# Patient Record
Sex: FEMALE | Race: BLACK OR AFRICAN AMERICAN | NOT HISPANIC OR LATINO | Employment: FULL TIME | URBAN - NONMETROPOLITAN AREA
[De-identification: names, ages, dates, MRNs, and addresses within clinical notes are randomized per-mention and may not be internally consistent; named-entity substitution may affect disease eponyms.]

---

## 2017-04-24 ENCOUNTER — HOSPITAL ENCOUNTER (EMERGENCY)
Facility: HOSPITAL | Age: 32
Discharge: HOME | End: 2017-04-24
Attending: EMERGENCY MEDICINE | Admitting: EMERGENCY MEDICINE

## 2017-04-24 VITALS
RESPIRATION RATE: 19 BRPM | SYSTOLIC BLOOD PRESSURE: 142 MMHG | HEART RATE: 79 BPM | DIASTOLIC BLOOD PRESSURE: 88 MMHG | WEIGHT: 208 LBS | OXYGEN SATURATION: 100 % | HEIGHT: 65 IN | BODY MASS INDEX: 34.66 KG/M2 | TEMPERATURE: 98.1 F

## 2017-04-24 DIAGNOSIS — I10 ESSENTIAL HYPERTENSION: Primary | ICD-10-CM

## 2017-04-24 LAB
ALBUMIN SERPL-MCNC: 4 G/DL (ref 3.5–5)
ALBUMIN/GLOB SERPL: 1.1 G/DL (ref 1–2)
ALP SERPL-CCNC: 55 U/L (ref 38–126)
ALT SERPL W P-5'-P-CCNC: 22 U/L (ref 13–69)
ANION GAP SERPL CALCULATED.3IONS-SCNC: 12.7 MMOL/L
AST SERPL-CCNC: 23 U/L (ref 15–46)
B-HCG UR QL: NEGATIVE
BASOPHILS # BLD AUTO: 0.05 10*3/MM3 (ref 0–0.2)
BASOPHILS NFR BLD AUTO: 0.9 % (ref 0–2.5)
BILIRUB SERPL-MCNC: 0.2 MG/DL (ref 0.2–1.3)
BILIRUB UR QL STRIP: NEGATIVE
BUN BLD-MCNC: 13 MG/DL (ref 7–20)
BUN/CREAT SERPL: 14.4 (ref 7.1–23.5)
CALCIUM SPEC-SCNC: 8.9 MG/DL (ref 8.4–10.2)
CHLORIDE SERPL-SCNC: 107 MMOL/L (ref 98–107)
CLARITY UR: CLEAR
CO2 SERPL-SCNC: 25 MMOL/L (ref 26–30)
COLOR UR: YELLOW
CREAT BLD-MCNC: 0.9 MG/DL (ref 0.6–1.3)
DEPRECATED RDW RBC AUTO: 41.8 FL (ref 37–54)
EOSINOPHIL # BLD AUTO: 0.09 10*3/MM3 (ref 0–0.7)
EOSINOPHIL NFR BLD AUTO: 1.6 % (ref 0–7)
ERYTHROCYTE [DISTWIDTH] IN BLOOD BY AUTOMATED COUNT: 17.2 % (ref 11.5–14.5)
GFR SERPL CREATININE-BSD FRML MDRD: 89 ML/MIN/1.73
GLOBULIN UR ELPH-MCNC: 3.7 GM/DL
GLUCOSE BLD-MCNC: 89 MG/DL (ref 74–98)
GLUCOSE UR STRIP-MCNC: NEGATIVE MG/DL
HCT VFR BLD AUTO: 27 % (ref 37–47)
HGB BLD-MCNC: 8.2 G/DL (ref 12–16)
HGB UR QL STRIP.AUTO: NEGATIVE
IMM GRANULOCYTES # BLD: 0.02 10*3/MM3 (ref 0–0.06)
IMM GRANULOCYTES NFR BLD: 0.3 % (ref 0–0.6)
KETONES UR QL STRIP: NEGATIVE
LEUKOCYTE ESTERASE UR QL STRIP.AUTO: NEGATIVE
LYMPHOCYTES # BLD AUTO: 1.18 10*3/MM3 (ref 0.6–3.4)
LYMPHOCYTES NFR BLD AUTO: 20.6 % (ref 10–50)
MCH RBC QN AUTO: 20.7 PG (ref 27–31)
MCHC RBC AUTO-ENTMCNC: 30.4 G/DL (ref 30–37)
MCV RBC AUTO: 68 FL (ref 81–99)
MONOCYTES # BLD AUTO: 1.01 10*3/MM3 (ref 0–0.9)
MONOCYTES NFR BLD AUTO: 17.6 % (ref 0–12)
NEUTROPHILS # BLD AUTO: 3.39 10*3/MM3 (ref 2–6.9)
NEUTROPHILS NFR BLD AUTO: 59 % (ref 37–80)
NITRITE UR QL STRIP: NEGATIVE
NRBC BLD MANUAL-RTO: 0 /100 WBC (ref 0–0)
PH UR STRIP.AUTO: 5.5 [PH] (ref 5–8)
PLATELET # BLD AUTO: 442 10*3/MM3 (ref 130–400)
PMV BLD AUTO: 10.3 FL (ref 6–12)
POTASSIUM BLD-SCNC: 3.7 MMOL/L (ref 3.5–5.1)
PROT SERPL-MCNC: 7.7 G/DL (ref 6.3–8.2)
PROT UR QL STRIP: ABNORMAL
RBC # BLD AUTO: 3.97 10*6/MM3 (ref 4.2–5.4)
SODIUM BLD-SCNC: 141 MMOL/L (ref 137–145)
SP GR UR STRIP: >=1.03 (ref 1–1.03)
UROBILINOGEN UR QL STRIP: ABNORMAL
WBC NRBC COR # BLD: 5.74 10*3/MM3 (ref 4.8–10.8)

## 2017-04-24 PROCEDURE — 81025 URINE PREGNANCY TEST: CPT | Performed by: NURSE PRACTITIONER

## 2017-04-24 PROCEDURE — 80053 COMPREHEN METABOLIC PANEL: CPT | Performed by: NURSE PRACTITIONER

## 2017-04-24 PROCEDURE — 81003 URINALYSIS AUTO W/O SCOPE: CPT | Performed by: NURSE PRACTITIONER

## 2017-04-24 PROCEDURE — 85025 COMPLETE CBC W/AUTO DIFF WBC: CPT | Performed by: NURSE PRACTITIONER

## 2017-04-24 PROCEDURE — 99283 EMERGENCY DEPT VISIT LOW MDM: CPT

## 2017-04-24 RX ORDER — CLONIDINE HYDROCHLORIDE 0.1 MG/1
0.1 TABLET ORAL 2 TIMES DAILY
Qty: 60 TABLET | Refills: 1 | OUTPATIENT
Start: 2017-04-24 | End: 2017-08-28

## 2017-04-25 NOTE — ED PROVIDER NOTES
Subjective   HPI Comments: Patient sought care with her primary care provider today for elevated blood pressure readings that have been found recently on an interview physical last week.  Her primary care provider found her to have elevated numbers with systolic 170s and when the patient did not respond immediately to administration of clonidine by mouth in the office, she was sent to the emergency department for further evaluation.      History provided by:  Patient   used: No        Review of Systems   Constitutional: Negative.  Negative for diaphoresis and fever.   HENT: Negative for sore throat.    Eyes: Negative.  Negative for pain and visual disturbance.   Respiratory: Negative for cough, shortness of breath, wheezing and stridor.    Cardiovascular: Negative.  Negative for chest pain.   Gastrointestinal: Negative.  Negative for abdominal pain, diarrhea, nausea and vomiting.   Endocrine: Negative.    Genitourinary: Negative.  Negative for dysuria.   Musculoskeletal: Negative.  Negative for back pain and neck pain.   Skin: Negative.  Negative for pallor and rash.   Allergic/Immunologic: Negative.    Neurological: Negative.  Negative for syncope and headaches.   Hematological: Negative.  Negative for adenopathy. Does not bruise/bleed easily.        Shouldn't states she has a history of iron deficiency anemia.   Psychiatric/Behavioral: Negative.  Negative for agitation.       History reviewed. No pertinent past medical history.    No Known Allergies    Past Surgical History:   Procedure Laterality Date   •  SECTION         History reviewed. No pertinent family history.    Social History     Social History   • Marital status:      Spouse name: N/A   • Number of children: N/A   • Years of education: N/A     Social History Main Topics   • Smoking status: Never Smoker   • Smokeless tobacco: None   • Alcohol use No   • Drug use: No   • Sexual activity: Not Asked     Other Topics  Concern   • None     Social History Narrative   • None           Objective   Physical Exam   Constitutional: She is oriented to person, place, and time. She appears well-developed.   HENT:   Head: Normocephalic.   Eyes: EOM are normal. Pupils are equal, round, and reactive to light.   Neck: Normal range of motion. Neck supple.   Cardiovascular: Normal rate and regular rhythm.    Pulmonary/Chest: Effort normal. She has no wheezes. She has no rales.   Abdominal: Soft. Bowel sounds are normal. She exhibits no distension. There is no rebound and no guarding.   Musculoskeletal: Normal range of motion. She exhibits no edema.   Neurological: She is alert and oriented to person, place, and time.   Skin: Skin is warm and dry.   Psychiatric: She has a normal mood and affect.   Nursing note and vitals reviewed.      Procedures         ED Course  ED Course   Comment By Time   Patient's blood pressures are  more reasonable now:  140s to 80s.  She is also less anxious.  Discussed outpatient management and parameters for real concern.  She understands and concurs with the plan of care to follow-up and to obtain means for checking her blood pressure and recording it regularly and conferring with her primary care doctor at [facility]. Kenisha Parker, RITA 04/24 2030                  [facility]    Final diagnoses:   Essential hypertension            Kenisha Parker, RITA  04/25/17 0059

## 2017-05-10 ENCOUNTER — TRANSCRIBE ORDERS (OUTPATIENT)
Dept: ADMINISTRATIVE | Facility: HOSPITAL | Age: 32
End: 2017-05-10

## 2017-05-10 DIAGNOSIS — Z13.9 SCREENING: Primary | ICD-10-CM

## 2017-06-05 ENCOUNTER — TRANSCRIBE ORDERS (OUTPATIENT)
Dept: ADMINISTRATIVE | Facility: HOSPITAL | Age: 32
End: 2017-06-05

## 2017-06-05 DIAGNOSIS — Z13.9 SCREENING: Primary | ICD-10-CM

## 2017-06-23 ENCOUNTER — HOSPITAL ENCOUNTER (EMERGENCY)
Facility: HOSPITAL | Age: 32
Discharge: HOME | End: 2017-06-24
Attending: EMERGENCY MEDICINE | Admitting: EMERGENCY MEDICINE

## 2017-06-23 DIAGNOSIS — S16.1XXA NECK STRAIN, INITIAL ENCOUNTER: Primary | ICD-10-CM

## 2017-06-23 PROCEDURE — 99283 EMERGENCY DEPT VISIT LOW MDM: CPT

## 2017-06-23 RX ORDER — HYDROCHLOROTHIAZIDE 12.5 MG/1
12.5 TABLET ORAL DAILY
COMMUNITY
End: 2019-11-19

## 2017-06-24 VITALS
HEIGHT: 67 IN | DIASTOLIC BLOOD PRESSURE: 72 MMHG | HEART RATE: 82 BPM | BODY MASS INDEX: 34.53 KG/M2 | SYSTOLIC BLOOD PRESSURE: 146 MMHG | OXYGEN SATURATION: 100 % | TEMPERATURE: 98.3 F | RESPIRATION RATE: 16 BRPM | WEIGHT: 220 LBS

## 2017-06-24 PROCEDURE — 63710000001 DIPHENHYDRAMINE PER 50 MG: Performed by: EMERGENCY MEDICINE

## 2017-06-24 RX ORDER — TRAMADOL HYDROCHLORIDE 50 MG/1
50 TABLET ORAL EVERY 6 HOURS PRN
Qty: 20 TABLET | Refills: 0 | Status: SHIPPED | OUTPATIENT
Start: 2017-06-24 | End: 2018-07-13

## 2017-06-24 RX ORDER — DIPHENHYDRAMINE HCL 25 MG
25 CAPSULE ORAL ONCE
Status: COMPLETED | OUTPATIENT
Start: 2017-06-24 | End: 2017-06-24

## 2017-06-24 RX ADMIN — DIPHENHYDRAMINE HYDROCHLORIDE 25 MG: 25 CAPSULE ORAL at 01:01
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
## 2018-07-13 ENCOUNTER — HOSPITAL ENCOUNTER (EMERGENCY)
Facility: HOSPITAL | Age: 33
Discharge: HOME | End: 2018-07-13
Attending: EMERGENCY MEDICINE | Admitting: EMERGENCY MEDICINE

## 2018-07-13 VITALS
SYSTOLIC BLOOD PRESSURE: 138 MMHG | DIASTOLIC BLOOD PRESSURE: 92 MMHG | OXYGEN SATURATION: 98 % | BODY MASS INDEX: 34.82 KG/M2 | HEIGHT: 65 IN | TEMPERATURE: 98.5 F | HEART RATE: 74 BPM | WEIGHT: 209 LBS | RESPIRATION RATE: 18 BRPM

## 2018-07-13 DIAGNOSIS — IMO0002 LEFT NASOLACRIMAL DUCT OBSTRUCTION: Primary | ICD-10-CM

## 2018-07-13 PROCEDURE — 99283 EMERGENCY DEPT VISIT LOW MDM: CPT

## 2018-07-13 RX ORDER — POLYMYXIN B SULFATE AND TRIMETHOPRIM 1; 10000 MG/ML; [USP'U]/ML
1 SOLUTION OPHTHALMIC EVERY 4 HOURS
Qty: 10 ML | Refills: 0 | OUTPATIENT
Start: 2018-07-13 | End: 2019-11-19

## 2018-07-13 RX ORDER — ETODOLAC 200 MG/1
200 CAPSULE ORAL EVERY 8 HOURS
Qty: 12 CAPSULE | Refills: 0 | OUTPATIENT
Start: 2018-07-13 | End: 2019-11-19

## 2018-07-13 NOTE — ED PROVIDER NOTES
"Subjective   Left medial orbital pain \"clogged duct\" reports decreased tears.    There is no eye pain or vision loss.    No fever, dc or swelling.    No trauma. No contacts.        Eye Problem   Location:  Left eye  Quality:  Aching  Severity:  Moderate  Onset quality:  Gradual  Duration:  3 days  Timing:  Constant  Progression:  Worsening  Chronicity:  New  Context: not burn, not contact lens problem, not direct trauma and not foreign body    Relieved by:  Nothing  Exacerbated by: touching.  Associated symptoms: no blurred vision, no crusting, no discharge, no headaches, no inflammation, no numbness, no photophobia, no redness, no swelling, no tearing and no weakness        Review of Systems   Constitutional: Negative for fever.   Eyes: Negative for blurred vision, photophobia, discharge and redness.   Respiratory: Negative for shortness of breath.    Cardiovascular: Negative for chest pain.   Gastrointestinal: Negative for abdominal pain.   Musculoskeletal: Negative for neck stiffness.   Neurological: Negative for dizziness, seizures, syncope, speech difficulty, weakness, light-headedness, numbness and headaches.   Psychiatric/Behavioral: Negative for confusion.   All other systems reviewed and are negative.      Past Medical History:   Diagnosis Date   • Hypertension        No Known Allergies    Past Surgical History:   Procedure Laterality Date   •  SECTION         History reviewed. No pertinent family history.    Social History     Social History   • Marital status:      Social History Main Topics   • Smoking status: Never Smoker   • Alcohol use No   • Drug use: No     Other Topics Concern   • Not on file           Objective   Physical Exam   Constitutional: She is oriented to person, place, and time. She appears well-developed and well-nourished.   HENT:   Head: Normocephalic and atraumatic.   Right Ear: External ear normal.   Left Ear: External ear normal.   Nose: Nose normal.   Mouth/Throat: " Oropharynx is clear and moist.   Eyes: Conjunctivae and EOM are normal. Pupils are equal, round, and reactive to light. Left eye exhibits no chemosis, no discharge, no exudate and no hordeolum. No foreign body present in the left eye. Left conjunctiva is not injected. Left conjunctiva has no hemorrhage. No scleral icterus.   Fundoscopic exam:       The left eye shows no exudate.   Slit lamp exam:       The left eye shows no corneal ulcer and no hyphema.   Neck: Normal range of motion. Neck supple.   Cardiovascular: Normal rate, regular rhythm, normal heart sounds and intact distal pulses.    Pulmonary/Chest: Effort normal and breath sounds normal.   Abdominal: Soft. Bowel sounds are normal.   Musculoskeletal: Normal range of motion.   Neurological: She is alert and oriented to person, place, and time.   Skin: Skin is warm and dry.   Psychiatric: She has a normal mood and affect. Her behavior is normal. Judgment and thought content normal.       Procedures           ED Course  ED Course as of Jul 13 1308   Fri Jul 13, 2018   1306 Not cw cellulitis or abscess. Fu with opthalmologist. Pt thankful and agreeable with tx poc. Well aware of the ss of worsening condition.  .   [JM]      ED Course User Index  [JM] RITA Hwang                  [facility]      Final diagnoses:   Left nasolacrimal duct obstruction            RITA Hwang  07/13/18 1308

## 2019-11-19 ENCOUNTER — HOSPITAL ENCOUNTER (EMERGENCY)
Facility: HOSPITAL | Age: 34
Discharge: HOME | End: 2019-11-19
Attending: EMERGENCY MEDICINE | Admitting: EMERGENCY MEDICINE

## 2019-11-19 VITALS
DIASTOLIC BLOOD PRESSURE: 123 MMHG | SYSTOLIC BLOOD PRESSURE: 160 MMHG | HEIGHT: 65 IN | TEMPERATURE: 98.4 F | OXYGEN SATURATION: 100 % | BODY MASS INDEX: 34.82 KG/M2 | RESPIRATION RATE: 18 BRPM | WEIGHT: 209 LBS | HEART RATE: 80 BPM

## 2019-11-19 DIAGNOSIS — I10 HYPERTENSION, UNSPECIFIED TYPE: Primary | ICD-10-CM

## 2019-11-19 LAB
ALBUMIN SERPL-MCNC: 3.9 G/DL (ref 3.5–5.2)
ALBUMIN/GLOB SERPL: 1.1 G/DL
ALP SERPL-CCNC: 68 U/L (ref 39–117)
ALT SERPL W P-5'-P-CCNC: 21 U/L (ref 1–33)
ANION GAP SERPL CALCULATED.3IONS-SCNC: 10 MMOL/L (ref 5–15)
AST SERPL-CCNC: 25 U/L (ref 1–32)
BASOPHILS # BLD AUTO: 0.07 10*3/MM3 (ref 0–0.2)
BASOPHILS NFR BLD AUTO: 1.3 % (ref 0–1.5)
BILIRUB SERPL-MCNC: 0.2 MG/DL (ref 0.2–1.2)
BUN BLD-MCNC: 14 MG/DL (ref 6–20)
BUN/CREAT SERPL: 17.1 (ref 7–25)
CALCIUM SPEC-SCNC: 9.4 MG/DL (ref 8.6–10.5)
CHLORIDE SERPL-SCNC: 103 MMOL/L (ref 98–107)
CO2 SERPL-SCNC: 26 MMOL/L (ref 22–29)
CREAT BLD-MCNC: 0.82 MG/DL (ref 0.57–1)
DEPRECATED RDW RBC AUTO: 45.4 FL (ref 37–54)
EOSINOPHIL # BLD AUTO: 0.09 10*3/MM3 (ref 0–0.4)
EOSINOPHIL NFR BLD AUTO: 1.6 % (ref 0.3–6.2)
ERYTHROCYTE [DISTWIDTH] IN BLOOD BY AUTOMATED COUNT: 15 % (ref 12.3–15.4)
GFR SERPL CREATININE-BSD FRML MDRD: 97 ML/MIN/1.73
GLOBULIN UR ELPH-MCNC: 3.7 GM/DL
GLUCOSE BLD-MCNC: 106 MG/DL (ref 65–99)
HCT VFR BLD AUTO: 39.9 % (ref 34–46.6)
HGB BLD-MCNC: 12.5 G/DL (ref 12–15.9)
HOLD SPECIMEN: NORMAL
HOLD SPECIMEN: NORMAL
IMM GRANULOCYTES # BLD AUTO: 0.01 10*3/MM3 (ref 0–0.05)
IMM GRANULOCYTES NFR BLD AUTO: 0.2 % (ref 0–0.5)
LYMPHOCYTES # BLD AUTO: 1.52 10*3/MM3 (ref 0.7–3.1)
LYMPHOCYTES NFR BLD AUTO: 27.6 % (ref 19.6–45.3)
MCH RBC QN AUTO: 25.9 PG (ref 26.6–33)
MCHC RBC AUTO-ENTMCNC: 31.3 G/DL (ref 31.5–35.7)
MCV RBC AUTO: 82.8 FL (ref 79–97)
MONOCYTES # BLD AUTO: 0.74 10*3/MM3 (ref 0.1–0.9)
MONOCYTES NFR BLD AUTO: 13.5 % (ref 5–12)
NEUTROPHILS # BLD AUTO: 3.07 10*3/MM3 (ref 1.7–7)
NEUTROPHILS NFR BLD AUTO: 55.8 % (ref 42.7–76)
NRBC BLD AUTO-RTO: 0 /100 WBC (ref 0–0.2)
PLATELET # BLD AUTO: 387 10*3/MM3 (ref 140–450)
PMV BLD AUTO: 10.6 FL (ref 6–12)
POTASSIUM BLD-SCNC: 3.6 MMOL/L (ref 3.5–5.2)
PROT SERPL-MCNC: 7.6 G/DL (ref 6–8.5)
RBC # BLD AUTO: 4.82 10*6/MM3 (ref 3.77–5.28)
SODIUM BLD-SCNC: 139 MMOL/L (ref 136–145)
WBC NRBC COR # BLD: 5.5 10*3/MM3 (ref 3.4–10.8)
WHOLE BLOOD HOLD SPECIMEN: NORMAL
WHOLE BLOOD HOLD SPECIMEN: NORMAL

## 2019-11-19 PROCEDURE — 85025 COMPLETE CBC W/AUTO DIFF WBC: CPT

## 2019-11-19 PROCEDURE — 99284 EMERGENCY DEPT VISIT MOD MDM: CPT

## 2019-11-19 PROCEDURE — 80053 COMPREHEN METABOLIC PANEL: CPT

## 2019-11-19 RX ORDER — LISINOPRIL 20 MG/1
20 TABLET ORAL DAILY
Qty: 30 TABLET | Refills: 0 | Status: SHIPPED | OUTPATIENT
Start: 2019-11-19

## 2019-11-19 RX ORDER — SODIUM CHLORIDE 0.9 % (FLUSH) 0.9 %
10 SYRINGE (ML) INJECTION AS NEEDED
Status: DISCONTINUED | OUTPATIENT
Start: 2019-11-19 | End: 2019-11-20 | Stop reason: HOSPADM

## 2019-11-19 RX ORDER — LISINOPRIL 10 MG/1
20 TABLET ORAL ONCE
Status: COMPLETED | OUTPATIENT
Start: 2019-11-19 | End: 2019-11-19

## 2019-11-19 RX ORDER — HYDROCHLOROTHIAZIDE 25 MG/1
25 TABLET ORAL ONCE
Status: COMPLETED | OUTPATIENT
Start: 2019-11-19 | End: 2019-11-19

## 2019-11-19 RX ORDER — AMLODIPINE BESYLATE 5 MG/1
5 TABLET ORAL DAILY
Qty: 30 TABLET | Refills: 0 | Status: SHIPPED | OUTPATIENT
Start: 2019-11-19

## 2019-11-19 RX ADMIN — LISINOPRIL 20 MG: 10 TABLET ORAL at 20:59

## 2019-11-19 RX ADMIN — HYDROCHLOROTHIAZIDE 25 MG: 25 TABLET ORAL at 20:59

## 2019-11-20 NOTE — DISCHARGE INSTRUCTIONS
Follow up with one of the [facility] Primary Care Providers below to setup primary care. If you need assistance coordinating a primary care appointment with a [facility] Primary Care Provider, please contact the Primary Care Coordinators at [phone] for appointment scheduling.    [facility], Primary Care   [address], [address]   [place], [place] [address] [phone]    [facility] Internal Medicine & Endocrinology  [address], [address]  [place] [address] [phone]    [facility] Family [facility]  [address], [address]   [place], [place] [address] [phone]    [facility] Primary Care  [address], [address]  [place], [place] [address] [phone]    [facility], Primary Care,   [address], [address]   [place], [place] [address] [phone]    [facility]  [address], [address]  [place], [place] [address] [phone]    [facility], Primary Care  [address], [address]  [place], [place] [address] [phone]    [facility] Internal Medicine & Pediatrics  [address], [address]   [place] [address] [phone]    [facility], Primary Care  [address] [place] [address] [phone]      [facility] Primary [facility]  [address], [address]   [place] [address] [phone]    [facility]  [address] [place] [address] [phone]      Follow up with one of the physician centers below to setup primary care.    [facility], [phone], [address], [address], [place], [facility]    [facility], [phone], [address]  [address]    [facility], [phone], [address] [place] [address];     [facility], [phone], [address] [place] [address]

## 2019-11-20 NOTE — ED PROVIDER NOTES
Subjective   Cynthia Ledezma is a 34 y.o.female who presents to the ED with c/o hypertension. The patient was diagnosed with hypertension in 2018 and has been taking Lisinopril and Amlodipine to control her blood pressure. She reports that recently she has had to abruptly move with her 2 dogs, do most of the moving herself, her grandfather has passed away, and his  is coming up soon, causing an increased amount of stress in her life. The patient has not had any blood pressure medication in 1 week so she presented to her unspecified primary care provider today at [facility]. Her blood pressure was elevated at the office, prompting her to present to the ED for further evaluation. Her surgical history includes  section. There are no other acute complaints at this time.        History provided by:  Patient  Hypertension   Severity:  Moderate  Onset quality:  Sudden  Duration:  1 day  Timing:  Constant  Progression:  Worsening  Chronicity:  Recurrent  Time since last dose of antihypertensive:  1 week  Context: stress    Relieved by:  None tried  Worsened by:  Nothing  Ineffective treatments:  None tried  Associated symptoms: anxiety, headaches and shortness of breath    Associated symptoms: no abdominal pain, no blurred vision, no chest pain, no fatigue, no fever and no loss of consciousness    Risk factors: no alcohol use, no cardiac disease, no cocaine use, no diabetes, no obesity, no prior aneurysm, no prior stroke and no tobacco use        Review of Systems   Constitutional: Negative for fatigue and fever.   Eyes: Negative for blurred vision.   Respiratory: Positive for shortness of breath.    Cardiovascular: Negative for chest pain.   Gastrointestinal: Negative for abdominal pain.   Neurological: Positive for headaches. Negative for loss of consciousness.   Psychiatric/Behavioral: The patient is nervous/anxious.         Patient has had increased stress in her life recently.   All other  systems reviewed and are negative.      Past Medical History:   Diagnosis Date   • Hypertension        No Known Allergies    Past Surgical History:   Procedure Laterality Date   •  SECTION         No family history on file.    Social History     Socioeconomic History   • Marital status:      Spouse name: Not on file   • Number of children: Not on file   • Years of education: Not on file   • Highest education level: Not on file   Tobacco Use   • Smoking status: Never Smoker   Substance and Sexual Activity   • Alcohol use: No   • Drug use: No         Objective   Physical Exam   Constitutional: She is oriented to person, place, and time. She appears well-developed and well-nourished. No distress.   HENT:   Head: Normocephalic and atraumatic.   Eyes: Conjunctivae are normal. No scleral icterus.   Neck: Normal range of motion. Neck supple.   Cardiovascular: Normal rate, regular rhythm and normal heart sounds.   No murmur heard.  Pulmonary/Chest: Effort normal and breath sounds normal. No respiratory distress.   Abdominal: Soft. There is no tenderness.   Musculoskeletal: Normal range of motion. She exhibits no edema.   Neurological: She is alert and oriented to person, place, and time.   Skin: Skin is warm and dry.   Psychiatric: She has a normal mood and affect. Her behavior is normal.   Nursing note and vitals reviewed.      Procedures         ED Course  ED Course as of e  Patient feels much better she is ready go home.  We will give her little pain and lisinopril.  She does not know the doses but I will start her off on a low-dose.  She is also familiar who her primary care doctor is but she knows how to contact them.  Reassuring blood work.  Will aware the signs symptoms worse condition.  [JM]      ED Course User Index  [JM] Kevin Saucedo APRN       Recent Results (from the past 24 hour(s))   Comprehensive Metabolic Panel    Collection Time: 19  6:04 PM   Result  Value Ref Range    Glucose 106 (H) 65 - 99 mg/dL    BUN 14 6 - 20 mg/dL    Creatinine 0.82 0.57 - 1.00 mg/dL    Sodium 139 136 - 145 mmol/L    Potassium 3.6 3.5 - 5.2 mmol/L    Chloride 103 98 - 107 mmol/L    CO2 26.0 22.0 - 29.0 mmol/L    Calcium 9.4 8.6 - 10.5 mg/dL    Total Protein 7.6 6.0 - 8.5 g/dL    Albumin 3.90 3.50 - 5.20 g/dL    ALT (SGPT) 21 1 - 33 U/L    AST (SGOT) 25 1 - 32 U/L    Alkaline Phosphatase 68 39 - 117 U/L    Total Bilirubin 0.2 0.2 - 1.2 mg/dL    eGFR  African Amer 97 >60 mL/min/1.73    Globulin 3.7 gm/dL    A/G Ratio 1.1 g/dL    BUN/Creatinine Ratio 17.1 7.0 - 25.0    Anion Gap 10.0 5.0 - 15.0 mmol/L   CBC Auto Differential    Collection Time: 11/19/19  6:04 PM   Result Value Ref Range    WBC 5.50 3.40 - 10.80 10*3/mm3    RBC 4.82 3.77 - 5.28 10*6/mm3    Hemoglobin 12.5 12.0 - 15.9 g/dL    Hematocrit 39.9 34.0 - 46.6 %    MCV 82.8 79.0 - 97.0 fL    MCH 25.9 (L) 26.6 - 33.0 pg    MCHC 31.3 (L) 31.5 - 35.7 g/dL    RDW 15.0 12.3 - 15.4 %    RDW-SD 45.4 37.0 - 54.0 fl    MPV 10.6 6.0 - 12.0 fL    Platelets 387 140 - 450 10*3/mm3    Neutrophil % 55.8 42.7 - 76.0 %    Lymphocyte % 27.6 19.6 - 45.3 %    Monocyte % 13.5 (H) 5.0 - 12.0 %    Eosinophil % 1.6 0.3 - 6.2 %    Basophil % 1.3 0.0 - 1.5 %    Immature Grans % 0.2 0.0 - 0.5 %    Neutrophils, Absolute 3.07 1.70 - 7.00 10*3/mm3    Lymphocytes, Absolute 1.52 0.70 - 3.10 10*3/mm3    Monocytes, Absolute 0.74 0.10 - 0.90 10*3/mm3    Eosinophils, Absolute 0.09 0.00 - 0.40 10*3/mm3    Basophils, Absolute 0.07 0.00 - 0.20 10*3/mm3    Immature Grans, Absolute 0.01 0.00 - 0.05 10*3/mm3    nRBC 0.0 0.0 - 0.2 /100 WBC   Light Blue Top    Collection Time: 11/19/19  6:04 PM   Result Value Ref Range    Extra Tube hold for add-on    Green Top (Gel)    Collection Time: 11/19/19  6:04 PM   Result Value Ref Range    Extra Tube Hold for add-ons.    Lavender Top    Collection Time: 11/19/19  6:04 PM   Result Value Ref Range    Extra Tube hold for add-on    Gold  "[facility]    Collection Time: 11/19/19  6:04 PM   Result Value Ref Range    Extra Tube Hold for add-ons.      Note: In addition to lab results from this visit, the labs listed above may include labs taken at another facility or during a different encounter within the last 24 hours. Please correlate lab times with ED admission and discharge times for further clarification of the services performed during this visit.    No orders to display     Vitals:    11/19/19 1746 11/19/19 2030 11/19/19 2130   BP: (!) 180/103 (!) 174/114 (!) 159/112   BP Location: Left arm     Patient Position: Sitting     Pulse: 70 74    Resp: 12     Temp: 98.4 °F (36.9 °C)     TempSrc: Oral     SpO2: 100% 99% 100%   Weight: 94.8 kg (209 lb)     Height: 165.1 cm (65\")       Medications   sodium chloride 0.9 % flush 10 mL (not administered)   hydroCHLOROthiazide (HYDRODIURIL) tablet 25 mg (25 mg Oral Given 11/19/19 2059)   lisinopril (PRINIVIL,ZESTRIL) tablet 20 mg (20 mg Oral Given 11/19/19 2059)     ECG/EMG Results (last 24 hours)     ** No results found for the last 24 hours. **        No orders to display                     MDM    Final diagnoses:   Hypertension, unspecified type       Documentation assistance provided by jose elias Foster.  Information recorded by the scribe was done at my direction and has been verified and validated by me.     Neil Foster  11/19/19 2050       Kevin Saucedo APRN  11/19/19 2204       Kevin Saucedo APRN  11/19/19 2207       Kevin Saucedo APRN  11/19/19 2211    "

## 2021-09-10 ENCOUNTER — APPOINTMENT (OUTPATIENT)
Dept: GENERAL RADIOLOGY | Facility: HOSPITAL | Age: 36
End: 2021-09-10

## 2021-09-10 ENCOUNTER — HOSPITAL ENCOUNTER (INPATIENT)
Facility: HOSPITAL | Age: 36
LOS: 3 days | Discharge: HOME | End: 2021-09-13
Attending: EMERGENCY MEDICINE | Admitting: INTERNAL MEDICINE

## 2021-09-10 DIAGNOSIS — L03.012 PARONYCHIA OF FINGER OF LEFT HAND: ICD-10-CM

## 2021-09-10 DIAGNOSIS — I16.0 HYPERTENSIVE URGENCY: Primary | ICD-10-CM

## 2021-09-10 LAB
ALBUMIN SERPL-MCNC: 4.3 G/DL (ref 3.5–5.2)
ALBUMIN/GLOB SERPL: 1.3 G/DL
ALP SERPL-CCNC: 64 U/L (ref 39–117)
ALT SERPL W P-5'-P-CCNC: 17 U/L (ref 1–33)
AMPHET+METHAMPHET UR QL: NEGATIVE
AMPHETAMINES UR QL: NEGATIVE
ANION GAP SERPL CALCULATED.3IONS-SCNC: 10 MMOL/L (ref 5–15)
AST SERPL-CCNC: 20 U/L (ref 1–32)
B-HCG UR QL: NEGATIVE
BARBITURATES UR QL SCN: NEGATIVE
BASOPHILS # BLD AUTO: 0.05 10*3/MM3 (ref 0–0.2)
BASOPHILS NFR BLD AUTO: 0.7 % (ref 0–1.5)
BENZODIAZ UR QL SCN: NEGATIVE
BILIRUB SERPL-MCNC: 0.3 MG/DL (ref 0–1.2)
BILIRUB UR QL STRIP: NEGATIVE
BUN SERPL-MCNC: 13 MG/DL (ref 6–20)
BUN/CREAT SERPL: 16.3 (ref 7–25)
BUPRENORPHINE SERPL-MCNC: NEGATIVE NG/ML
CALCIUM SPEC-SCNC: 9.7 MG/DL (ref 8.6–10.5)
CANNABINOIDS SERPL QL: NEGATIVE
CHLORIDE SERPL-SCNC: 103 MMOL/L (ref 98–107)
CLARITY UR: CLEAR
CO2 SERPL-SCNC: 25 MMOL/L (ref 22–29)
COCAINE UR QL: NEGATIVE
COLOR UR: YELLOW
CREAT SERPL-MCNC: 0.8 MG/DL (ref 0.57–1)
DEPRECATED RDW RBC AUTO: 42.7 FL (ref 37–54)
EOSINOPHIL # BLD AUTO: 0.11 10*3/MM3 (ref 0–0.4)
EOSINOPHIL NFR BLD AUTO: 1.6 % (ref 0.3–6.2)
ERYTHROCYTE [DISTWIDTH] IN BLOOD BY AUTOMATED COUNT: 14.3 % (ref 12.3–15.4)
FLUAV SUBTYP SPEC NAA+PROBE: NOT DETECTED
FLUBV RNA ISLT QL NAA+PROBE: NOT DETECTED
GFR SERPL CREATININE-BSD FRML MDRD: 99 ML/MIN/1.73
GLOBULIN UR ELPH-MCNC: 3.3 GM/DL
GLUCOSE SERPL-MCNC: 90 MG/DL (ref 65–99)
GLUCOSE UR STRIP-MCNC: NEGATIVE MG/DL
HBA1C MFR BLD: 5.6 % (ref 4.8–5.6)
HCT VFR BLD AUTO: 41.1 % (ref 34–46.6)
HGB BLD-MCNC: 13.4 G/DL (ref 12–15.9)
HGB UR QL STRIP.AUTO: NEGATIVE
HOLD SPECIMEN: NORMAL
IMM GRANULOCYTES # BLD AUTO: 0.02 10*3/MM3 (ref 0–0.05)
IMM GRANULOCYTES NFR BLD AUTO: 0.3 % (ref 0–0.5)
KETONES UR QL STRIP: NEGATIVE
LEUKOCYTE ESTERASE UR QL STRIP.AUTO: NEGATIVE
LYMPHOCYTES # BLD AUTO: 1.88 10*3/MM3 (ref 0.7–3.1)
LYMPHOCYTES NFR BLD AUTO: 27.2 % (ref 19.6–45.3)
MCH RBC QN AUTO: 26.9 PG (ref 26.6–33)
MCHC RBC AUTO-ENTMCNC: 32.6 G/DL (ref 31.5–35.7)
MCV RBC AUTO: 82.4 FL (ref 79–97)
METHADONE UR QL SCN: NEGATIVE
MONOCYTES # BLD AUTO: 0.84 10*3/MM3 (ref 0.1–0.9)
MONOCYTES NFR BLD AUTO: 12.2 % (ref 5–12)
NEUTROPHILS NFR BLD AUTO: 4 10*3/MM3 (ref 1.7–7)
NEUTROPHILS NFR BLD AUTO: 58 % (ref 42.7–76)
NITRITE UR QL STRIP: NEGATIVE
NRBC BLD AUTO-RTO: 0 /100 WBC (ref 0–0.2)
NT-PROBNP SERPL-MCNC: 51.4 PG/ML (ref 0–450)
OPIATES UR QL: NEGATIVE
OXYCODONE UR QL SCN: NEGATIVE
PCP UR QL SCN: NEGATIVE
PH UR STRIP.AUTO: 6.5 [PH] (ref 5–8)
PLATELET # BLD AUTO: 352 10*3/MM3 (ref 140–450)
PMV BLD AUTO: 10.7 FL (ref 6–12)
POTASSIUM SERPL-SCNC: 3.7 MMOL/L (ref 3.5–5.2)
PROPOXYPH UR QL: NEGATIVE
PROT SERPL-MCNC: 7.6 G/DL (ref 6–8.5)
PROT UR QL STRIP: NEGATIVE
QT INTERVAL: 410 MS
QTC INTERVAL: 439 MS
RBC # BLD AUTO: 4.99 10*6/MM3 (ref 3.77–5.28)
SARS-COV-2 RNA PNL SPEC NAA+PROBE: NOT DETECTED
SODIUM SERPL-SCNC: 138 MMOL/L (ref 136–145)
SP GR UR STRIP: 1.01 (ref 1–1.03)
TRICYCLICS UR QL SCN: NEGATIVE
TROPONIN T SERPL-MCNC: <0.01 NG/ML (ref 0–0.03)
TSH SERPL DL<=0.05 MIU/L-ACNC: 4.02 UIU/ML (ref 0.27–4.2)
UROBILINOGEN UR QL STRIP: NORMAL
WBC # BLD AUTO: 6.9 10*3/MM3 (ref 3.4–10.8)
WHOLE BLOOD HOLD SPECIMEN: NORMAL
WHOLE BLOOD HOLD SPECIMEN: NORMAL

## 2021-09-10 PROCEDURE — 83880 ASSAY OF NATRIURETIC PEPTIDE: CPT | Performed by: EMERGENCY MEDICINE

## 2021-09-10 PROCEDURE — 80306 DRUG TEST PRSMV INSTRMNT: CPT | Performed by: EMERGENCY MEDICINE

## 2021-09-10 PROCEDURE — 83036 HEMOGLOBIN GLYCOSYLATED A1C: CPT | Performed by: NURSE PRACTITIONER

## 2021-09-10 PROCEDURE — 81025 URINE PREGNANCY TEST: CPT | Performed by: NURSE PRACTITIONER

## 2021-09-10 PROCEDURE — 87636 SARSCOV2 & INF A&B AMP PRB: CPT | Performed by: EMERGENCY MEDICINE

## 2021-09-10 PROCEDURE — 99285 EMERGENCY DEPT VISIT HI MDM: CPT

## 2021-09-10 PROCEDURE — 84484 ASSAY OF TROPONIN QUANT: CPT | Performed by: EMERGENCY MEDICINE

## 2021-09-10 PROCEDURE — 0H9GXZZ DRAINAGE OF LEFT HAND SKIN, EXTERNAL APPROACH: ICD-10-PCS | Performed by: EMERGENCY MEDICINE

## 2021-09-10 PROCEDURE — 93005 ELECTROCARDIOGRAM TRACING: CPT | Performed by: EMERGENCY MEDICINE

## 2021-09-10 PROCEDURE — 25010000002 HEPARIN (PORCINE) PER 1000 UNITS: Performed by: NURSE PRACTITIONER

## 2021-09-10 PROCEDURE — 81003 URINALYSIS AUTO W/O SCOPE: CPT | Performed by: EMERGENCY MEDICINE

## 2021-09-10 PROCEDURE — 80050 GENERAL HEALTH PANEL: CPT | Performed by: EMERGENCY MEDICINE

## 2021-09-10 PROCEDURE — 71045 X-RAY EXAM CHEST 1 VIEW: CPT

## 2021-09-10 PROCEDURE — 99222 1ST HOSP IP/OBS MODERATE 55: CPT | Performed by: INTERNAL MEDICINE

## 2021-09-10 RX ORDER — AMOXICILLIN 250 MG
2 CAPSULE ORAL 2 TIMES DAILY
Status: DISCONTINUED | OUTPATIENT
Start: 2021-09-10 | End: 2021-09-13 | Stop reason: HOSPADM

## 2021-09-10 RX ORDER — ONDANSETRON 2 MG/ML
4 INJECTION INTRAMUSCULAR; INTRAVENOUS EVERY 6 HOURS PRN
Status: DISCONTINUED | OUTPATIENT
Start: 2021-09-10 | End: 2021-09-13 | Stop reason: HOSPADM

## 2021-09-10 RX ORDER — BISACODYL 5 MG/1
5 TABLET, DELAYED RELEASE ORAL DAILY PRN
Status: DISCONTINUED | OUTPATIENT
Start: 2021-09-10 | End: 2021-09-13 | Stop reason: HOSPADM

## 2021-09-10 RX ORDER — SODIUM CHLORIDE 0.9 % (FLUSH) 0.9 %
10 SYRINGE (ML) INJECTION AS NEEDED
Status: DISCONTINUED | OUTPATIENT
Start: 2021-09-10 | End: 2021-09-13 | Stop reason: HOSPADM

## 2021-09-10 RX ORDER — ACETAMINOPHEN 325 MG/1
650 TABLET ORAL EVERY 4 HOURS PRN
Status: DISCONTINUED | OUTPATIENT
Start: 2021-09-10 | End: 2021-09-13 | Stop reason: HOSPADM

## 2021-09-10 RX ORDER — POLYETHYLENE GLYCOL 3350 17 G/17G
17 POWDER, FOR SOLUTION ORAL DAILY PRN
Status: DISCONTINUED | OUTPATIENT
Start: 2021-09-10 | End: 2021-09-13 | Stop reason: HOSPADM

## 2021-09-10 RX ORDER — SODIUM CHLORIDE 0.9 % (FLUSH) 0.9 %
10 SYRINGE (ML) INJECTION EVERY 12 HOURS SCHEDULED
Status: DISCONTINUED | OUTPATIENT
Start: 2021-09-10 | End: 2021-09-13 | Stop reason: HOSPADM

## 2021-09-10 RX ORDER — HEPARIN SODIUM 5000 [USP'U]/ML
5000 INJECTION, SOLUTION INTRAVENOUS; SUBCUTANEOUS EVERY 8 HOURS SCHEDULED
Status: DISCONTINUED | OUTPATIENT
Start: 2021-09-10 | End: 2021-09-13 | Stop reason: HOSPADM

## 2021-09-10 RX ORDER — ACETAMINOPHEN 650 MG/1
650 SUPPOSITORY RECTAL EVERY 4 HOURS PRN
Status: DISCONTINUED | OUTPATIENT
Start: 2021-09-10 | End: 2021-09-13 | Stop reason: HOSPADM

## 2021-09-10 RX ORDER — ACETAMINOPHEN 160 MG/5ML
650 SOLUTION ORAL EVERY 4 HOURS PRN
Status: DISCONTINUED | OUTPATIENT
Start: 2021-09-10 | End: 2021-09-13 | Stop reason: HOSPADM

## 2021-09-10 RX ORDER — BISACODYL 10 MG
10 SUPPOSITORY, RECTAL RECTAL DAILY PRN
Status: DISCONTINUED | OUTPATIENT
Start: 2021-09-10 | End: 2021-09-13 | Stop reason: HOSPADM

## 2021-09-10 RX ADMIN — SODIUM CHLORIDE, PRESERVATIVE FREE 10 ML: 5 INJECTION INTRAVENOUS at 21:18

## 2021-09-10 RX ADMIN — HEPARIN SODIUM 5000 UNITS: 5000 INJECTION, SOLUTION INTRAVENOUS; SUBCUTANEOUS at 21:15

## 2021-09-10 RX ADMIN — NICARDIPINE HYDROCHLORIDE 5 MG/HR: 0.1 INJECTION, SOLUTION INTRAVENOUS at 16:38

## 2021-09-10 RX ADMIN — DOCUSATE SODIUM 50 MG AND SENNOSIDES 8.6 MG 2 TABLET: 8.6; 5 TABLET, FILM COATED ORAL at 21:16

## 2021-09-10 RX ADMIN — ACETAMINOPHEN 650 MG: 325 TABLET ORAL at 23:06

## 2021-09-10 RX ADMIN — NICARDIPINE HYDROCHLORIDE 5 MG/HR: 0.1 INJECTION, SOLUTION INTRAVENOUS at 22:06

## 2021-09-10 RX ADMIN — NICARDIPINE HYDROCHLORIDE 5 MG/HR: 0.1 INJECTION, SOLUTION INTRAVENOUS at 13:01

## 2021-09-10 NOTE — H&P
[facility] Medicine Services  HISTORY AND PHYSICAL    Patient Name: Cynthia Ledezma  : 1985  MRN: [id number]  Primary Care Physician: Amy Paintre MD  Date of admission: 9/10/2021      Subjective   Subjective     Chief Complaint:  Elevated BP    HPI:  Cynthia Ledezma is a 35 y.o. female PMH HTN presenting with elevated BP. She states she has been out of BP meds for at least one and a half months. She denies headache, vision disturbance, paresthesia, chest pain, heart palpitations, shortness of breath. She was placed on a cardene drip with improvement. She was taking lisinopril and norvasc. She follows with her PCP for HTN. She also presented with a very painful area to her left hand fourth finger. ED provider identified it has Paronychia of finger of left hand and performed an I&D.     Review of Systems   Gen- No fevers, chills  CV- No chest pain, palpitations  Resp- No cough, dyspnea  GI- No N/V/D, abd pain  MS- (+) pain to left hand 4th finger.  All other systems reviewed and are negative.     Personal History     Past Medical History:   Diagnosis Date   • Anemia    • Hypertension        Past Surgical History:   Procedure Laterality Date   •  SECTION     • HYSTERECTOMY         Family History: family history includes Heart disease in her mother; No Known Problems in her father; Obesity in her mother. Otherwise pertinent FHx was reviewed and unremarkable.     Social History:  reports that she has never smoked. She has never used smokeless tobacco. She reports that she does not drink alcohol and does not use drugs.  Social History     Social History Narrative   • Not on file       Medications:    Available home medication information reviewed.  Medications Prior to Admission   Medication Sig Dispense Refill Last Dose   • amLODIPine (NORVASC) 5 MG tablet Take 1 tablet by mouth Daily. 30 tablet 0 More than a month at Unknown time   • lisinopril (PRINIVIL,ZESTRIL) 20  MG tablet Take 1 tablet by mouth Daily. 30 tablet 0 More than a month at Unknown time       No Known Allergies    Objective   Objective     Vital Signs:   Temp:  [97.5 °F (36.4 °C)-98.5 °F (36.9 °C)] 98.2 °F (36.8 °C)  Heart Rate:  [] 84  Resp:  [14-18] 14  BP: (133-215)/() 148/100        Physical Exam   Constitutional: Drowsy but awaken easily with verbal stimuli.   Eyes: PERRLA, sclerae anicteric, no conjunctival injection  HENT: NCAT, mucous membranes moist  Neck: Supple, no thyromegaly, no lymphadenopathy, trachea midline  Respiratory: Clear to auscultation bilaterally, nonlabored respirations   Cardiovascular: RRR, no murmurs, rubs, or gallops, palpable pedal pulses bilaterally  Gastrointestinal: Positive bowel sounds, soft, nontender, nondistended  Musculoskeletal: No bilateral ankle edema, no clubbing or cyanosis to extremities. Paronychia of left hand 4th finger.   Psychiatric: Appropriate affect, cooperative  Neurologic: Oriented x 3, strength symmetric in all extremities, Cranial Nerves grossly intact to confrontation, speech clear  Skin: No rashes    Results Reviewed:  I have personally reviewed current lab and radiology data.    Results from last 7 days   Lab Units 09/10/21  1443   WBC 10*3/mm3 6.90   HEMOGLOBIN g/dL 13.4   HEMATOCRIT % 41.1   PLATELETS 10*3/mm3 352     Results from last 7 days   Lab Units 09/10/21  1443 09/10/21  1126   SODIUM mmol/L 138  --    POTASSIUM mmol/L 3.7  --    CHLORIDE mmol/L 103  --    CO2 mmol/L 25.0  --    BUN mg/dL 13  --    CREATININE mg/dL 0.80  --    GLUCOSE mg/dL 90  --    CALCIUM mg/dL 9.7  --    ALT (SGPT) U/L 17  --    AST (SGOT) U/L 20  --    TROPONIN T ng/mL  --  <0.010   PROBNP pg/mL  --  51.4     Estimated Creatinine Clearance: 117.5 mL/min (by C-G formula based on SCr of 0.8 mg/dL).  Brief Urine Lab Results  (Last result in the past 365 days)      Color   Clarity   Blood   Leuk Est   Nitrite   Protein   CREAT   Urine HCG        09/10/21 3010                Negative     09/10/21 1235 Yellow Clear Negative Negative Negative Negative             Imaging Results (Last 24 Hours)     Procedure Component Value Units Date/Time    XR Chest 1 View [[id number]] Collected: 09/10/21 1309     Updated: 09/10/21 1319    Narrative:      EXAMINATION: XR CHEST 1 VW-      INDICATION: HTN.      COMPARISON: None.     FINDINGS: Portable chest reveals cardiac and mediastinal silhouettes  within normal limits. The lung fields are clear. No focal parenchymal  opacification is present.  No pleural effusion or pneumothorax. The bony  structures are unremarkable. Pulmonary vascularity is within normal  limits.           Impression:      No acute cardiopulmonary disease.     D:  09/10/2021  E:  09/10/2021               Assessment/Plan   Assessment / Plan     Active Hospital Problems    Diagnosis  POA   • **Hypertensive urgency [I16.0]  Yes   • Anemia [D64.9]  Unknown   • Paronychia of finger of left hand [L03.012]  Unknown     Hospital Course:   Cynthia Ledezma is a 35 y.o. female PMH HTN presenting with elevated /159. She has been out of BP med for 1.5 months. Started on Cardene drip with improvement of BP. ED provider noted paronychia of 4th finger of left hand and performed an I&D.    HTN urgency  - presentation /159, but asymptomatic  - cardene drip with improvement  - Hold Cardene drip for SBP less than 165 for tonight, further decrease for a.m.  - takes lisinopril and norvasc at home  - restart home meds in the morning.   - TSH normal  - lipid panel, A1c    Paronychia 4th finger left hand  - ED performed I&D    DVT prophylaxis:  [facility]    CODE STATUS:    Code Status and Medical Interventions:   Ordered at: 09/10/21 1729     Level Of Support Discussed With:    Patient     Code Status:    CPR     Medical Interventions (Level of Support Prior to Arrest):    Full       Electronically signed by RITA Haddad, 09/10/21, 5:30 PM EDT.      Attending   Admission Attestation        I have seen and examined the patient, performing an independent face-to-face diagnostic evaluation with plan of care reviewed and developed with the advanced practice clinician (APC).      Brief Summary Statement:   Cynthia Ledezma is a 35 y.o. female With a PMH significant for hypertension, anemia who presents to the ED due to elevated blood pressure.  Patient reports that she has been under a lot of stress lately due to life circumstances and job changes.  She has been out of her blood pressure medications for over 1 month.  For the past several days she has had increasing redness, swelling and pain to her fourth digit on her left hand.  She was concerned that the area was infected.  She went to see her PCP today and due to marked elevation of blood pressure she was instructed to come to the ED.  Patient denies fever, malaise, headache, vision changes, nausea, vomiting, chest pain, shortness of breath, cough.  Remainder of detailed HPI is as noted by APC and has been reviewed and/or edited by me for completeness.    Attending Physical Exam:  Constitutional: Awake, alert  Eyes: PERRLA, sclerae anicteric, no conjunctival injection  HENT: NCAT, mucous membranes moist  Neck: Supple, no thyromegaly, no lymphadenopathy, trachea midline  Respiratory: Clear to auscultation bilaterally, nonlabored respirations   Cardiovascular: RRR, no murmurs, rubs, or gallops, palpable pedal pulses bilaterally  Gastrointestinal: Positive bowel sounds, soft, nontender, nondistended  Musculoskeletal: No bilateral ankle edema, no clubbing or cyanosis to extremities  Psychiatric: Appropriate affect, cooperative  Neurologic: Oriented x 3, strength symmetric in all extremities, Cranial Nerves grossly intact to confrontation, speech clear  Skin: Mild erythema and swelling to distal left fourth digit      Brief Assessment/Plan :  See detailed assessment and plan developed with APC which I have reviewed and/or edited for  completeness.        Admission Status: I believe that this patient meets INPATIENT status due to hypertensive urgency and need to titrate IV medications making this hospitalization high risk for this patient.  I feel patient’s risk for adverse outcomes and need for care warrant INPATIENT evaluation and I predict the patient’s care encounter to likely last beyond 2 midnights.      Vangie Quezada, DO  09/10/21

## 2021-09-10 NOTE — CASE MANAGEMENT/SOCIAL WORK
Discharge Planning Assessment  [facility]     Patient Name: Cynthia Ledezma  MRN: [id number]  Today's Date: 9/10/2021    Admit Date: 9/10/2021    Discharge Needs Assessment     Row Name 09/10/21 1943       Living Environment    Lives With  alone    Current Living Arrangements  home/apartment/condo    Primary Care Provided by  self    Provides Primary Care For  no one    Family Caregiver if Needed  friend(s)       Resource/Environmental Concerns    Resource/Environmental Concerns  none    Transportation Concerns  car, none       Transition Planning    Patient/Family Anticipates Transition to  home    Patient/Family Anticipated Services at Transition  none    Transportation Anticipated  family or friend will provide       Discharge Needs Assessment    Readmission Within the Last 30 Days  no previous admission in last 30 days    Equipment Currently Used at Home  none    Concerns to be Addressed  denies needs/concerns at this time    Anticipated Changes Related to Illness  none        Discharge Plan     Row Name 09/10/21 1944       Plan    Plan  Initial    Plan Comments  CM spoke with patient via phone. Patient resides in [facility], alone. Patient is independent with ADL’s, denies any DME. Patient denies any current home health or outpatient services. Patient has medical insurance, prescription coverage and is able to afford/obtain medications without difficulty. Patient has a PCP. Patient denies any discharge planning needs. Goal is home. CM will continue to follow    Final Discharge Disposition Code  30 - still a patient        Continued Care and Services - Admitted Since 9/10/2021    Coordination has not been started for this encounter.         Demographic Summary     Row Name 09/10/21 1942       General Information    Arrived From  home    Referral Source  emergency department    Reason for Consult  discharge planning    Preferred Language  English     Used During This Interaction  no        Functional  Status     Row Name 09/10/21 1942       Functional Status    Usual Activity Tolerance  good    Current Activity Tolerance  good       Functional Status, IADL    Medications  independent    Meal Preparation  independent    Housekeeping  independent    Laundry  independent    Shopping  independent       Mental Status    General Appearance WDL  WDL       Mental Status Summary    Recent Changes in Mental Status/Cognitive Functioning  no changes       Employment/    Employment Status  employed full-time        Psychosocial    No documentation.       Abuse/Neglect    No documentation.       Legal    No documentation.       Substance Abuse    No documentation.       Patient Forms    No documentation.           Felipa Green RN

## 2021-09-10 NOTE — PLAN OF CARE
RA, NSR/ST. Chang gtt currently infusing at 50 ml/hr.     Problem: Adult Inpatient Plan of Care  Goal: Plan of Care Review  Outcome: Ongoing, Progressing  Goal: Patient-Specific Goal (Individualized)  Outcome: Ongoing, Progressing  Goal: Absence of Hospital-Acquired Illness or Injury  Outcome: Ongoing, Progressing  Intervention: Identify and Manage Fall Risk  Recent Flowsheet Documentation  Taken 9/10/2021 1800 by Kaylyn Schuster RN  Safety Promotion/Fall Prevention:   activity supervised   assistive device/personal items within reach   clutter free environment maintained   fall prevention program maintained   nonskid shoes/slippers when out of bed   safety round/check completed  Intervention: Prevent Skin Injury  Recent Flowsheet Documentation  Taken 9/10/2021 1800 by Kaylyn Schuster RN  Body Position: position changed independently  Skin Protection:   adhesive use limited   incontinence pads utilized   tubing/devices free from skin contact  Taken 9/10/2021 1744 by Kaylyn Shcuster RN  Skin Protection:   adhesive use limited   incontinence pads utilized   tubing/devices free from skin contact  Intervention: Prevent and Manage VTE (venous thromboembolism) Risk  Recent Flowsheet Documentation  Taken 9/10/2021 1744 by Kaylyn Schuster RN  VTE Prevention/Management:   bilateral   dorsiflexion/plantar flexion performed   bleeding risk factor(s) identified  Goal: Optimal Comfort and Wellbeing  Outcome: Ongoing, Progressing  Intervention: Provide Person-Centered Care  Recent Flowsheet Documentation  Taken 9/10/2021 1744 by Kaylyn Schuster RN  Trust Relationship/Rapport:   care explained   choices provided   thoughts/feelings acknowledged  Goal: Readiness for Transition of Care  Outcome: Ongoing, Progressing  Intervention: Mutually Develop Transition Plan  Recent Flowsheet Documentation  Taken 9/10/2021 1728 by Kaylyn Schuster RN  Transportation Anticipated: family or friend will provide  Patient/Family Anticipated Services at  Transition: none  Patient/Family Anticipates Transition to: home  Taken 9/10/2021 1727 by Kaylyn Schuster, RN  Equipment Currently Used at Home: none   Goal Outcome Evaluation:

## 2021-09-10 NOTE — ED PROVIDER NOTES
[facility]    EMERGENCY DEPARTMENT ENCOUNTER      Pt Name: Cynthia Ledezma  MRN: [id number]  YOB: 1985  Date of evaluation: 9/10/2021  Provider: Zhao Serna DO    CHIEF COMPLAINT       Chief Complaint   Patient presents with   • Hypertension         HISTORY OF PRESENT ILLNESS  (Location/Symptom, Timing/Onset, Context/Setting, Quality, Duration, Modifying Factors, Severity.)   Cynthia Ledezma is a 35 y.o. female who presents to the emergency department for evaluation of a couple different complaints.  She notes for left hand fourth finger she has a very sore area on the outer surface of the nailbed, cuticle region which she feels may be infected as it has been a throbbing, very sore and tender over the last few days.  She tried to express herself but was unsuccessful.  She denies any falls, injury or trauma to the nailbed itself, she does not chew on her nails.  The patient denies any fever or chills, she does endorse that she has been out of her blood pressure medication for least few months has been unable to get them filled and has been checking her blood pressure at home and has been significantly elevated.  She states she is asymptomatic from this, denies any headache, vision changes, neck pain or stiffness, chest pain or palpitations.  She denies any unilateral weakness, numbness or tingling.  Does have a history of pretty significant poorly controlled blood pressure.  She does not have any known underlying renal disease, no known cardiac disease, has remained asymptomatic with her elevated blood pressures.  Does not follow with a cardiac specialist.  She denies any recent illness, fevers or chills, difficulty breathing.  She does not have any other acute systemic complaints at this time.      Nursing notes were reviewed.    REVIEW OF SYSTEMS    (2-9 systems for level 4, 10 or more for level 5)   ROS:  General:  No fevers, no chills, no weakness  Cardiovascular:  No chest pain, no  palpitations  Respiratory:  No shortness of breath, no cough, no wheezing  Gastrointestinal:  No pain, no nausea, no vomiting, no diarrhea  Musculoskeletal:  No muscle pain, left distal fourth finger pain  Skin:  No rash  Neurologic:  No headache, no extremity numbness, no extremity tingling, no extremity weakness  Psychiatric:  No anxiety  Genitourinary:  No dysuria, no hematuria    Except as noted above the remainder of the review of systems was reviewed and negative.       PAST MEDICAL HISTORY     Past Medical History:   Diagnosis Date   • Anemia    • Hypertension          SURGICAL HISTORY       Past Surgical History:   Procedure Laterality Date   •  SECTION           CURRENT MEDICATIONS       Current Facility-Administered Medications:   •  niCARdipine (CARDENE) 20 mg in 200 mL NS infusion, 5-15 mg/hr, Intravenous, Titrated, Zhao Serna DO, Last Rate: 100 mL/hr at 09/10/21 1459, 10 mg/hr at 09/10/21 1459  •  sodium chloride 0.9 % flush 10 mL, 10 mL, Intravenous, PRN, Zhao Serna DO    Current Outpatient Medications:   •  amLODIPine (NORVASC) 5 MG tablet, Take 1 tablet by mouth Daily., Disp: 30 tablet, Rfl: 0  •  lisinopril (PRINIVIL,ZESTRIL) 20 MG tablet, Take 1 tablet by mouth Daily., Disp: 30 tablet, Rfl: 0    ALLERGIES     Patient has no known allergies.    FAMILY HISTORY     History reviewed. No pertinent family history.       SOCIAL HISTORY       Social History     Socioeconomic History   • Marital status:      Spouse name: Not on file   • Number of children: Not on file   • Years of education: Not on file   • Highest education level: Not on file   Tobacco Use   • Smoking status: Never Smoker   • Smokeless tobacco: Never Used   Substance and Sexual Activity   • Alcohol use: No   • Drug use: No         PHYSICAL EXAM    (up to 7 for level 4, 8 or more for level 5)     Vitals:    09/10/21 1500 09/10/21 1515 09/10/21 1530 09/10/21 1545   BP: 146/99 144/99 144/91 143/88   BP  Location:       Patient Position:       Pulse: 80 82 98 96   Resp:       Temp:       TempSrc:       SpO2: 99% 99% 100% 99%   Weight:       Height:           Physical Exam  General : Patient is awake, alert, oriented, in no acute distress, nontoxic appearing, GCS 15  HEENT: Pupils are equally round and reactive to light, EOMI, conjunctivae clear, sclerae white  Neck: Neck is supple, full range of motion, trachea midline  Cardiac: Heart regular rate, rhythm, no murmurs, rubs, or gallops  Lungs: Lungs are clear to auscultation, there is no wheezing, rhonchi, or rales. There is no use of accessory muscles  Chest wall: There is no tenderness to palpation over the chest wall or over ribs  Abdomen: Abdomen is soft, nontender, nondistended. There are no firm or pulsatile masses, no rebound rigidity or guarding.   Musculoskeletal: Left hand 4th finger along the radial surface of the nail cuticle there is a paronychia present.  Very slight bulge in this area, there is some mildly tender indurated tissue.  No nailbed involvement.  5 out of 5 strength in all 4 extremities.  No focal muscle deficits are appreciated  Neuro: Motor intact, sensory intact, level of consciousness is normal, cerebellar function is normal, reflexes are grossly normal.  GCS 15  Dermatology: Skin is warm and dry  Psych: Mentation is grossly normal, cognition is grossly normal. Affect is appropriate.      DIAGNOSTIC RESULTS     EKG: All EKG's are interpreted by the Emergency Department Physician who either signs or Co-signs this chart in the absence of a cardiologist.    ECG 12 Lead   Final Result   Test Reason : htn   Blood Pressure :   */*   mmHG   Vent. Rate :  69 BPM     Atrial Rate :  69 BPM      P-R Int : 154 ms          QRS Dur :  88 ms       QT Int : 410 ms       P-R-T Axes :  45  -7  21 degrees      QTc Int : 439 ms      Normal sinus rhythm   Voltage criteria for left ventricular hypertrophy   Abnormal ECG   No previous ECGs available   Confirmed  by CHRIS BASHIR MD ([id number]) on 9/10/2021 3:04:55 PM      Referred By: ED MD           Confirmed By: CHRIS BASHIR MD          RADIOLOGY:   Non-plain film images such as CT, Ultrasound and MRI are read by the radiologist. Plain radiographic images are visualized and preliminarily interpreted by the emergency physician with the below findings:      [] Radiologist's Report Reviewed:  XR Chest 1 View   Preliminary Result   No acute cardiopulmonary disease.       D:  09/10/2021   E:  09/10/2021                ED BEDSIDE ULTRASOUND:   Performed by ED Physician - none    LABS:    I have reviewed and interpreted all of the currently available lab results from this visit (if applicable):  Results for orders placed or performed during the hospital encounter of 09/10/21   COVID-19 and FLU A/B PCR - Swab, Nasopharynx    Specimen: Nasopharynx; Swab   Result Value Ref Range    COVID19 Not Detected Not Detected - Ref. Range    Influenza A PCR Not Detected Not Detected    Influenza B PCR Not Detected Not Detected   Urinalysis With Microscopic If Indicated (No Culture) - Urine, Clean Catch    Specimen: Urine, Clean Catch   Result Value Ref Range    Color, UA Yellow Yellow, Straw    Appearance, UA Clear Clear    pH, UA 6.5 5.0 - 8.0    Specific Gravity, UA 1.011 1.001 - 1.030    Glucose, UA Negative Negative    Ketones, UA Negative Negative    Bilirubin, UA Negative Negative    Blood, UA Negative Negative    Protein, UA Negative Negative    Leuk Esterase, UA Negative Negative    Nitrite, UA Negative Negative    Urobilinogen, UA 0.2 E.U./dL 0.2 - 1.0 E.U./dL   BNP    Specimen: Blood   Result Value Ref Range    proBNP 51.4 0.0 - 450.0 pg/mL   Troponin    Specimen: Blood   Result Value Ref Range    Troponin T <0.010 0.000 - 0.030 ng/mL   Urine Drug Screen - Urine, Clean Catch    Specimen: Urine, Clean Catch   Result Value Ref Range    THC, Screen, Urine Negative Negative    Phencyclidine (PCP), Urine Negative Negative    Cocaine  Screen, Urine Negative Negative    Methamphetamine, Ur Negative Negative    Opiate Screen Negative Negative    Amphetamine Screen, Urine Negative Negative    Benzodiazepine Screen, Urine Negative Negative    Tricyclic Antidepressants Screen Negative Negative    Methadone Screen, Urine Negative Negative    Barbiturates Screen, Urine Negative Negative    Oxycodone Screen, Urine Negative Negative    Propoxyphene Screen Negative Negative    Buprenorphine, Screen, Urine Negative Negative   TSH    Specimen: Blood   Result Value Ref Range    TSH 4.020 0.270 - 4.200 uIU/mL   Comprehensive Metabolic Panel    Specimen: Blood   Result Value Ref Range    Glucose 90 65 - 99 mg/dL    BUN 13 6 - 20 mg/dL    Creatinine 0.80 0.57 - 1.00 mg/dL    Sodium 138 136 - 145 mmol/L    Potassium 3.7 3.5 - 5.2 mmol/L    Chloride 103 98 - 107 mmol/L    CO2 25.0 22.0 - 29.0 mmol/L    Calcium 9.7 8.6 - 10.5 mg/dL    Total Protein 7.6 6.0 - 8.5 g/dL    Albumin 4.30 3.50 - 5.20 g/dL    ALT (SGPT) 17 1 - 33 U/L    AST (SGOT) 20 1 - 32 U/L    Alkaline Phosphatase 64 39 - 117 U/L    Total Bilirubin 0.3 0.0 - 1.2 mg/dL    eGFR  African Amer 99 >60 mL/min/1.73    Globulin 3.3 gm/dL    A/G Ratio 1.3 g/dL    BUN/Creatinine Ratio 16.3 7.0 - 25.0    Anion Gap 10.0 5.0 - 15.0 mmol/L   CBC Auto Differential    Specimen: Blood   Result Value Ref Range    WBC 6.90 3.40 - 10.80 10*3/mm3    RBC 4.99 3.77 - 5.28 10*6/mm3    Hemoglobin 13.4 12.0 - 15.9 g/dL    Hematocrit 41.1 34.0 - 46.6 %    MCV 82.4 79.0 - 97.0 fL    MCH 26.9 26.6 - 33.0 pg    MCHC 32.6 31.5 - 35.7 g/dL    RDW 14.3 12.3 - 15.4 %    RDW-SD 42.7 37.0 - 54.0 fl    MPV 10.7 6.0 - 12.0 fL    Platelets 352 140 - 450 10*3/mm3    Neutrophil % 58.0 42.7 - 76.0 %    Lymphocyte % 27.2 19.6 - 45.3 %    Monocyte % 12.2 (H) 5.0 - 12.0 %    Eosinophil % 1.6 0.3 - 6.2 %    Basophil % 0.7 0.0 - 1.5 %    Immature Grans % 0.3 0.0 - 0.5 %    Neutrophils, Absolute 4.00 1.70 - 7.00 10*3/mm3    Lymphocytes, Absolute  1.88 0.70 - 3.10 10*3/mm3    Monocytes, Absolute 0.84 0.10 - 0.90 10*3/mm3    Eosinophils, Absolute 0.11 0.00 - 0.40 10*3/mm3    Basophils, Absolute 0.05 0.00 - 0.20 10*3/mm3    Immature Grans, Absolute 0.02 0.00 - 0.05 10*3/mm3    nRBC 0.0 0.0 - 0.2 /100 WBC   ECG 12 Lead   Result Value Ref Range    QT Interval 410 ms    QTC Interval 439 ms   Green Top (Gel)   Result Value Ref Range    Extra Tube Hold for add-ons.    Lavender Top   Result Value Ref Range    Extra Tube hold for add-on    Gold Top - SST   Result Value Ref Range    Extra Tube Hold for add-ons.    Gray Top   Result Value Ref Range    Extra Tube Hold for add-ons.    Light Blue Top   Result Value Ref Range    Extra Tube hold for add-on         All other labs were within normal range or not returned as of this dictation.      EMERGENCY DEPARTMENT COURSE and DIFFERENTIAL DIAGNOSIS/MDM:   Vitals:    Vitals:    09/10/21 1500 09/10/21 1515 09/10/21 1530 09/10/21 1545   BP: 146/99 144/99 144/91 143/88   BP Location:       Patient Position:       Pulse: 80 82 98 96   Resp:       Temp:       TempSrc:       SpO2: 99% 99% 100% 99%   Weight:       Height:                Patient with significant blood pressure likely with a history of underlying LVH, currently not on any antihypertensives who presented with significant blood pressures, systolic over 215, diastolic 130-160.  This is likely to the patient being off her antihypertensive medications, increased stress recently.  She is asymptomatic from this, but given the significantly elevated blood pressures we did start the patient on a Cardene infusion to titrate down her pressures.  She also has a paronychia on the left index finger which I did draine using an 18-gauge needle.  Patient's blood work is unremarkable.  Patient's pressures did improve on the a Cardene infusion.  Given that she has been out of her antihypertensives 1.5 months and has a significantly elevated blood pressures which could lead to long-term  neurological cardiovascular renal damage we will plan on admission to the hospital for medication titration, transition over to oral medication following up with blood pressure specialist.  Case discussed with hospitalist team for admission.      MEDICATIONS ADMINISTERED IN ED:  Medications   sodium chloride 0.9 % flush 10 mL (has no administration in time range)   niCARdipine (CARDENE) 20 mg in 200 mL NS infusion (10 mg/hr Intravenous Rate/Dose Change 9/10/21 6010)       PROCEDURES:  Procedures     I&D:   paronychia of the left index finger was cleansed using Betadine, and an 18-gauge needle was used to open the area of fluctuance, very small amount of purulent drainage was drained from the paronychia site itself.  Patient tolerated this procedure without complication.  Distal neurovascular status is intact.    CRITICAL CARE TIME    Total Critical Care time was 35 minutes, excluding separately reportable procedures.  Acute hypertensive urgency requiring titration of IV Cardene infusion, continuous monitoring, discussions with consultants. There was a high probability of clinically significant/life threatening deterioration in the patient's condition which required my urgent intervention.      FINAL IMPRESSION      1. Hypertensive urgency    2. Paronychia of finger of left hand          DISPOSITION/PLAN     ED Disposition     ED Disposition Condition Comment    Decision to Admit            Comment: Please note this report has been produced using speech recognition software.      Zhao Serna DO  Attending Emergency Physician               Zhao Serna DO  09/10/21 4893

## 2021-09-11 LAB
ALBUMIN SERPL-MCNC: 3.9 G/DL (ref 3.5–5.2)
ALBUMIN/GLOB SERPL: 1.2 G/DL
ALP SERPL-CCNC: 64 U/L (ref 39–117)
ALT SERPL W P-5'-P-CCNC: 16 U/L (ref 1–33)
ANION GAP SERPL CALCULATED.3IONS-SCNC: 10 MMOL/L (ref 5–15)
AST SERPL-CCNC: 17 U/L (ref 1–32)
BASOPHILS # BLD AUTO: 0.05 10*3/MM3 (ref 0–0.2)
BASOPHILS NFR BLD AUTO: 0.7 % (ref 0–1.5)
BILIRUB SERPL-MCNC: 0.4 MG/DL (ref 0–1.2)
BUN SERPL-MCNC: 12 MG/DL (ref 6–20)
BUN/CREAT SERPL: 14.5 (ref 7–25)
CALCIUM SPEC-SCNC: 9.3 MG/DL (ref 8.6–10.5)
CHLORIDE SERPL-SCNC: 103 MMOL/L (ref 98–107)
CHOLEST SERPL-MCNC: 134 MG/DL (ref 0–200)
CO2 SERPL-SCNC: 24 MMOL/L (ref 22–29)
CREAT SERPL-MCNC: 0.83 MG/DL (ref 0.57–1)
DEPRECATED RDW RBC AUTO: 43.8 FL (ref 37–54)
EOSINOPHIL # BLD AUTO: 0.11 10*3/MM3 (ref 0–0.4)
EOSINOPHIL NFR BLD AUTO: 1.6 % (ref 0.3–6.2)
ERYTHROCYTE [DISTWIDTH] IN BLOOD BY AUTOMATED COUNT: 14.8 % (ref 12.3–15.4)
GFR SERPL CREATININE-BSD FRML MDRD: 95 ML/MIN/1.73
GLOBULIN UR ELPH-MCNC: 3.3 GM/DL
GLUCOSE SERPL-MCNC: 102 MG/DL (ref 65–99)
HCT VFR BLD AUTO: 40.1 % (ref 34–46.6)
HDLC SERPL-MCNC: 48 MG/DL (ref 40–60)
HGB BLD-MCNC: 13 G/DL (ref 12–15.9)
IMM GRANULOCYTES # BLD AUTO: 0.02 10*3/MM3 (ref 0–0.05)
IMM GRANULOCYTES NFR BLD AUTO: 0.3 % (ref 0–0.5)
LDLC SERPL CALC-MCNC: 73 MG/DL (ref 0–100)
LDLC/HDLC SERPL: 1.54 {RATIO}
LYMPHOCYTES # BLD AUTO: 1.64 10*3/MM3 (ref 0.7–3.1)
LYMPHOCYTES NFR BLD AUTO: 23.4 % (ref 19.6–45.3)
MCH RBC QN AUTO: 26.5 PG (ref 26.6–33)
MCHC RBC AUTO-ENTMCNC: 32.4 G/DL (ref 31.5–35.7)
MCV RBC AUTO: 81.7 FL (ref 79–97)
MONOCYTES # BLD AUTO: 1 10*3/MM3 (ref 0.1–0.9)
MONOCYTES NFR BLD AUTO: 14.3 % (ref 5–12)
NEUTROPHILS NFR BLD AUTO: 4.19 10*3/MM3 (ref 1.7–7)
NEUTROPHILS NFR BLD AUTO: 59.7 % (ref 42.7–76)
NRBC BLD AUTO-RTO: 0 /100 WBC (ref 0–0.2)
PLATELET # BLD AUTO: 356 10*3/MM3 (ref 140–450)
PMV BLD AUTO: 11 FL (ref 6–12)
POTASSIUM SERPL-SCNC: 3.7 MMOL/L (ref 3.5–5.2)
PROT SERPL-MCNC: 7.2 G/DL (ref 6–8.5)
RBC # BLD AUTO: 4.91 10*6/MM3 (ref 3.77–5.28)
SODIUM SERPL-SCNC: 137 MMOL/L (ref 136–145)
TRIGL SERPL-MCNC: 60 MG/DL (ref 0–150)
VLDLC SERPL-MCNC: 13 MG/DL (ref 5–40)
WBC # BLD AUTO: 7.01 10*3/MM3 (ref 3.4–10.8)

## 2021-09-11 PROCEDURE — 80053 COMPREHEN METABOLIC PANEL: CPT | Performed by: NURSE PRACTITIONER

## 2021-09-11 PROCEDURE — 87147 CULTURE TYPE IMMUNOLOGIC: CPT | Performed by: INTERNAL MEDICINE

## 2021-09-11 PROCEDURE — 25010000002 HEPARIN (PORCINE) PER 1000 UNITS: Performed by: NURSE PRACTITIONER

## 2021-09-11 PROCEDURE — 99233 SBSQ HOSP IP/OBS HIGH 50: CPT | Performed by: INTERNAL MEDICINE

## 2021-09-11 PROCEDURE — 87186 SC STD MICRODIL/AGAR DIL: CPT | Performed by: INTERNAL MEDICINE

## 2021-09-11 PROCEDURE — 80061 LIPID PANEL: CPT | Performed by: NURSE PRACTITIONER

## 2021-09-11 PROCEDURE — 87205 SMEAR GRAM STAIN: CPT | Performed by: INTERNAL MEDICINE

## 2021-09-11 PROCEDURE — 87070 CULTURE OTHR SPECIMN AEROBIC: CPT | Performed by: INTERNAL MEDICINE

## 2021-09-11 PROCEDURE — 85025 COMPLETE CBC W/AUTO DIFF WBC: CPT | Performed by: NURSE PRACTITIONER

## 2021-09-11 RX ORDER — ALUMINA, MAGNESIA, AND SIMETHICONE 2400; 2400; 240 MG/30ML; MG/30ML; MG/30ML
15 SUSPENSION ORAL EVERY 6 HOURS PRN
Status: DISCONTINUED | OUTPATIENT
Start: 2021-09-11 | End: 2021-09-13 | Stop reason: HOSPADM

## 2021-09-11 RX ORDER — HYDRALAZINE HYDROCHLORIDE 20 MG/ML
20 INJECTION INTRAMUSCULAR; INTRAVENOUS EVERY 6 HOURS PRN
Status: DISCONTINUED | OUTPATIENT
Start: 2021-09-11 | End: 2021-09-13 | Stop reason: HOSPADM

## 2021-09-11 RX ORDER — AMLODIPINE BESYLATE 5 MG/1
5 TABLET ORAL DAILY
Status: DISCONTINUED | OUTPATIENT
Start: 2021-09-11 | End: 2021-09-12

## 2021-09-11 RX ORDER — AMOXICILLIN AND CLAVULANATE POTASSIUM 500; 125 MG/1; MG/1
1 TABLET, FILM COATED ORAL EVERY 8 HOURS SCHEDULED
Status: DISCONTINUED | OUTPATIENT
Start: 2021-09-11 | End: 2021-09-12

## 2021-09-11 RX ORDER — LISINOPRIL 20 MG/1
20 TABLET ORAL DAILY
Status: DISCONTINUED | OUTPATIENT
Start: 2021-09-11 | End: 2021-09-12

## 2021-09-11 RX ADMIN — SODIUM CHLORIDE, PRESERVATIVE FREE 10 ML: 5 INJECTION INTRAVENOUS at 08:27

## 2021-09-11 RX ADMIN — AMLODIPINE BESYLATE 5 MG: 5 TABLET ORAL at 08:27

## 2021-09-11 RX ADMIN — ALUMINUM HYDROXIDE, MAGNESIUM HYDROXIDE, AND DIMETHICONE 15 ML: 400; 400; 40 SUSPENSION ORAL at 21:24

## 2021-09-11 RX ADMIN — HEPARIN SODIUM 5000 UNITS: 5000 INJECTION, SOLUTION INTRAVENOUS; SUBCUTANEOUS at 05:33

## 2021-09-11 RX ADMIN — NICARDIPINE HYDROCHLORIDE 10 MG/HR: 0.1 INJECTION, SOLUTION INTRAVENOUS at 09:07

## 2021-09-11 RX ADMIN — HEPARIN SODIUM 5000 UNITS: 5000 INJECTION, SOLUTION INTRAVENOUS; SUBCUTANEOUS at 13:56

## 2021-09-11 RX ADMIN — AMOXICILLIN AND CLAVULANATE POTASSIUM 500 MG: 500; 125 TABLET, FILM COATED ORAL at 21:24

## 2021-09-11 RX ADMIN — LISINOPRIL 20 MG: 20 TABLET ORAL at 08:27

## 2021-09-11 RX ADMIN — HEPARIN SODIUM 5000 UNITS: 5000 INJECTION, SOLUTION INTRAVENOUS; SUBCUTANEOUS at 21:24

## 2021-09-11 RX ADMIN — DOCUSATE SODIUM 50 MG AND SENNOSIDES 8.6 MG 2 TABLET: 8.6; 5 TABLET, FILM COATED ORAL at 21:23

## 2021-09-11 RX ADMIN — SODIUM CHLORIDE, PRESERVATIVE FREE 10 ML: 5 INJECTION INTRAVENOUS at 21:24

## 2021-09-11 RX ADMIN — ACETAMINOPHEN 650 MG: 325 TABLET ORAL at 16:22

## 2021-09-11 RX ADMIN — DOCUSATE SODIUM 50 MG AND SENNOSIDES 8.6 MG 2 TABLET: 8.6; 5 TABLET, FILM COATED ORAL at 08:27

## 2021-09-11 NOTE — PAYOR COMM NOTE
"Sarah Ledezma (35 y.o. Female)     Date of Birth Social Security Number Address Home Phone MRN    1985  [address] [place] [address] [phone] [id number]    Alevism Marital Status          Samaritan        Admission Date Admission Type Admitting Provider Attending Provider Department, Room/Bed    9/10/21 Emergency Jonny Jones MD Kal[place]ar, Masoud, MD [facility] 5H, S583/1    Discharge Date Discharge Disposition Discharge Destination                       Attending Provider: Jonny Jones MD    Allergies: No Known Allergies    Isolation: None   Infection: None   Code Status: CPR    Ht: 165.1 cm (65\")   Wt: 97.6 kg (215 lb 3.2 oz)    Admission Cmt: None   Principal Problem: Hypertensive urgency [I16.0]                 Active Insurance as of 9/10/2021     Primary Coverage     Payor Plan Insurance Group Employer/Plan Group    [employer] [place] [employer] [place]ICAID      Payor Plan Address Payor Plan Phone Number Payor Plan Fax Number Effective Dates    [address] [address] [phone]  2018 - None Entered    [facility] [id number]       Subscriber Name Subscriber Birth Date Member ID       SARAH LEDEZMA 1985 [id number]                 Emergency Contacts      (Rel.) Home Phone Work Phone Mobile Phone    Contact,No One [phone] -- --            Insurance Information                [facility] MEDICAID Phone: [phone]    Subscriber: Sarah Ledezma Subscriber#: [id number]    Group#:  Precert#:              History & Physical      Vangie Quezada DO at 09/10/21 1730              [facility]  HISTORY AND PHYSICAL    Patient Name: Sarah Ledezma  : 1985  MRN: [id number]  Primary Care Physician: Amy Painter MD  Date of admission: 9/10/2021      Subjective   Subjective     Chief Complaint:  Elevated BP    HPI:  Sarah Ledezma is a 35 y.o. female PMH HTN presenting with " elevated BP. She states she has been out of BP meds for at least one and a half months. She denies headache, vision disturbance, paresthesia, chest pain, heart palpitations, shortness of breath. She was placed on a cardene drip with improvement. She was taking lisinopril and norvasc. She follows with her PCP for HTN. She also presented with a very painful area to her left hand fourth finger. ED provider identified it has Paronychia of finger of left hand and performed an I&D.     Review of Systems   Gen- No fevers, chills  CV- No chest pain, palpitations  Resp- No cough, dyspnea  GI- No N/V/D, abd pain  MS- (+) pain to left hand 4th finger.  All other systems reviewed and are negative.     Personal History     Past Medical History:   Diagnosis Date   • Anemia    • Hypertension        Past Surgical History:   Procedure Laterality Date   •  SECTION     • HYSTERECTOMY         Family History: family history includes Heart disease in her mother; No Known Problems in her father; Obesity in her mother. Otherwise pertinent FHx was reviewed and unremarkable.     Social History:  reports that she has never smoked. She has never used smokeless tobacco. She reports that she does not drink alcohol and does not use drugs.  Social History     Social History Narrative   • Not on file       Medications:    Available home medication information reviewed.  Medications Prior to Admission   Medication Sig Dispense Refill Last Dose   • amLODIPine (NORVASC) 5 MG tablet Take 1 tablet by mouth Daily. 30 tablet 0 More than a month at Unknown time   • lisinopril (PRINIVIL,ZESTRIL) 20 MG tablet Take 1 tablet by mouth Daily. 30 tablet 0 More than a month at Unknown time       No Known Allergies    Objective   Objective     Vital Signs:   Temp:  [97.5 °F (36.4 °C)-98.5 °F (36.9 °C)] 98.2 °F (36.8 °C)  Heart Rate:  [] 84  Resp:  [14-18] 14  BP: (133-215)/() 148/100        Physical Exam   Constitutional: Drowsy but awaken  easily with verbal stimuli.   Eyes: PERRLA, sclerae anicteric, no conjunctival injection  HENT: NCAT, mucous membranes moist  Neck: Supple, no thyromegaly, no lymphadenopathy, trachea midline  Respiratory: Clear to auscultation bilaterally, nonlabored respirations   Cardiovascular: RRR, no murmurs, rubs, or gallops, palpable pedal pulses bilaterally  Gastrointestinal: Positive bowel sounds, soft, nontender, nondistended  Musculoskeletal: No bilateral ankle edema, no clubbing or cyanosis to extremities. Paronychia of left hand 4th finger.   Psychiatric: Appropriate affect, cooperative  Neurologic: Oriented x 3, strength symmetric in all extremities, Cranial Nerves grossly intact to confrontation, speech clear  Skin: No rashes    Results Reviewed:  I have personally reviewed current lab and radiology data.    Results from last 7 days   Lab Units 09/10/21  1443   WBC 10*3/mm3 6.90   HEMOGLOBIN g/dL 13.4   HEMATOCRIT % 41.1   PLATELETS 10*3/mm3 352     Results from last 7 days   Lab Units 09/10/21  1443 09/10/21  1126   SODIUM mmol/L 138  --    POTASSIUM mmol/L 3.7  --    CHLORIDE mmol/L 103  --    CO2 mmol/L 25.0  --    BUN mg/dL 13  --    CREATININE mg/dL 0.80  --    GLUCOSE mg/dL 90  --    CALCIUM mg/dL 9.7  --    ALT (SGPT) U/L 17  --    AST (SGOT) U/L 20  --    TROPONIN T ng/mL  --  <0.010   PROBNP pg/mL  --  51.4     Estimated Creatinine Clearance: 117.5 mL/min (by C-G formula based on SCr of 0.8 mg/dL).  Brief Urine Lab Results  (Last result in the past 365 days)      Color   Clarity   Blood   Leuk Est   Nitrite   Protein   CREAT   Urine HCG        09/10/21 1235               Negative     09/10/21 1235 Yellow Clear Negative Negative Negative Negative             Imaging Results (Last 24 Hours)     Procedure Component Value Units Date/Time    XR Chest 1 View [[id number]] Collected: 09/10/21 1309     Updated: 09/10/21 1319    Narrative:      EXAMINATION: XR CHEST 1 VW-      INDICATION: HTN.      COMPARISON: None.      FINDINGS: Portable chest reveals cardiac and mediastinal silhouettes  within normal limits. The lung fields are clear. No focal parenchymal  opacification is present.  No pleural effusion or pneumothorax. The bony  structures are unremarkable. Pulmonary vascularity is within normal  limits.           Impression:      No acute cardiopulmonary disease.     D:  09/10/2021  E:  09/10/2021               Assessment/Plan   Assessment / Plan     Active Hospital Problems    Diagnosis  POA   • **Hypertensive urgency [I16.0]  Yes   • Anemia [D64.9]  Unknown   • Paronychia of finger of left hand [L03.012]  Unknown     Hospital Course:   Cynthia Ledezma is a 35 y.o. female PMH HTN presenting with elevated /159. She has been out of BP med for 1.5 months. Started on Cardene drip with improvement of BP. ED provider noted paronychia of 4th finger of left hand and performed an I&D.    HTN urgency  - presentation /159, but asymptomatic  - cardene drip with improvement  - Hold Cardene drip for SBP less than 165 for tonight, further decrease for a.m.  - takes lisinopril and norvasc at home  - restart home meds in the morning.   - TSH normal  - lipid panel, A1c    Paronychia 4th finger left hand  - ED performed I&D    DVT prophylaxis:  [facility]    CODE STATUS:    Code Status and Medical Interventions:   Ordered at: 09/10/21 1729     Level Of Support Discussed With:    Patient     Code Status:    CPR     Medical Interventions (Level of Support Prior to Arrest):    Full       Electronically signed by RITA Haddad, 09/10/21, 5:30 PM EDT.      Attending   Admission Attestation       I have seen and examined the patient, performing an independent face-to-face diagnostic evaluation with plan of care reviewed and developed with the advanced practice clinician (APC).      Brief Summary Statement:   Cynthia Ledezma is a 35 y.o. female With a PMH significant for hypertension, anemia who presents to the ED due to elevated blood  pressure.  Patient reports that she has been under a lot of stress lately due to life circumstances and job changes.  She has been out of her blood pressure medications for over 1 month.  For the past several days she has had increasing redness, swelling and pain to her fourth digit on her left hand.  She was concerned that the area was infected.  She went to see her PCP today and due to marked elevation of blood pressure she was instructed to come to the ED.  Patient denies fever, malaise, headache, vision changes, nausea, vomiting, chest pain, shortness of breath, cough.  Remainder of detailed HPI is as noted by APC and has been reviewed and/or edited by me for completeness.    Attending Physical Exam:  Constitutional: Awake, alert  Eyes: PERRLA, sclerae anicteric, no conjunctival injection  HENT: NCAT, mucous membranes moist  Neck: Supple, no thyromegaly, no lymphadenopathy, trachea midline  Respiratory: Clear to auscultation bilaterally, nonlabored respirations   Cardiovascular: RRR, no murmurs, rubs, or gallops, palpable pedal pulses bilaterally  Gastrointestinal: Positive bowel sounds, soft, nontender, nondistended  Musculoskeletal: No bilateral ankle edema, no clubbing or cyanosis to extremities  Psychiatric: Appropriate affect, cooperative  Neurologic: Oriented x 3, strength symmetric in all extremities, Cranial Nerves grossly intact to confrontation, speech clear  Skin: Mild erythema and swelling to distal left fourth digit      Brief Assessment/Plan :  See detailed assessment and plan developed with APC which I have reviewed and/or edited for completeness.        Admission Status: I believe that this patient meets INPATIENT status due to hypertensive urgency and need to titrate IV medications making this hospitalization high risk for this patient.  I feel patient’s risk for adverse outcomes and need for care warrant INPATIENT evaluation and I predict the patient’s care encounter to likely last beyond 2  midnights.      Vangie Quezada DO  09/10/21                  Electronically signed by Vangie Quezada DO at 09/10/21 3146

## 2021-09-11 NOTE — PROGRESS NOTES
[facility] Medicine Services  PROGRESS NOTE    Patient Name: Cynthia Ledezma  : 1985  MRN: [id number]    Date of Admission: 9/10/2021  Primary Care Physician: Amy Painter MD    Subjective   Subjective     CC:  Left fourth digit pain and swelling.    HPI:  Resting in bed in no acute distress and overall comfortable except the left fourth digit which is tender and swollen.  No fever or chills.  No chest pain or palpitation or shortness of breath.  No nausea, vomiting, diarrhea, abdominal pain.  No headache or vision changes.    ROS:  As above    Objective   Objective     Vital Signs:   Temp:  [98 °F (36.7 °C)-98.5 °F (36.9 °C)] 98.3 °F (36.8 °C)  Heart Rate:  [] 84  Resp:  [14-18] 16  BP: (133-159)/() 146/95     Physical Exam:  Constitutional: No acute distress, looks comfortable  HENT: NCAT, mucous membranes moist  Respiratory: Clear to auscultation bilaterally, respiratory effort normal   Cardiovascular: RRR, no murmurs, rubs, or gallops  Gastrointestinal: Abdomen is obese.  Positive bowel sounds, soft, nontender, nondistended  Musculoskeletal: No bilateral ankle edema, swelling and tenderness of left fourth digit.  Psychiatric: Appropriate affect, cooperative  Neurologic: Oriented x 3, strength symmetric in all extremities, Cranial Nerves grossly intact to confrontation, speech clear  Skin: No rashes    Results Reviewed:  LAB RESULTS:      Lab 21  0350 09/10/21  1443   WBC 7.01 6.90   HEMOGLOBIN 13.0 13.4   HEMATOCRIT 40.1 41.1   PLATELETS 356 352   NEUTROS ABS 4.19 4.00   IMMATURE GRANS (ABS) 0.02 0.02   LYMPHS ABS 1.64 1.88   MONOS ABS 1.00* 0.84   EOS ABS 0.11 0.11   MCV 81.7 82.4         Lab 21  0350 09/10/21  1443 09/10/21  1126   SODIUM 137 138  --    POTASSIUM 3.7 3.7  --    CHLORIDE 103 103  --    CO2 24.0 25.0  --    ANION GAP 10.0 10.0  --    BUN 12 13  --    CREATININE 0.83 0.80  --    GLUCOSE 102* 90  --    CALCIUM 9.3 9.7  --     HEMOGLOBIN A1C  --  5.60  --    TSH  --   --  4.020         Lab 09/11/21  0350 09/10/21  1443   TOTAL PROTEIN 7.2 7.6   ALBUMIN 3.90 4.30   GLOBULIN 3.3 3.3   ALT (SGPT) 16 17   AST (SGOT) 17 20   BILIRUBIN 0.4 0.3   ALK PHOS 64 64         Lab 09/10/21  1126   PROBNP 51.4   TROPONIN T <0.010         Lab 09/11/21  0350   CHOLESTEROL 134   LDL CHOL 73   HDL CHOL 48   TRIGLYCERIDES 60             Brief Urine Lab Results  (Last result in the past 365 days)      Color   Clarity   Blood   Leuk Est   Nitrite   Protein   CREAT   Urine HCG        09/10/21 1235               Negative     09/10/21 1235 Yellow Clear Negative Negative Negative Negative               Microbiology Results Abnormal     Procedure Component Value - Date/Time    COVID PRE-OP / PRE-PROCEDURE SCREENING ORDER (NO ISOLATION) - Swab, Nasopharynx [[id number]]  (Normal) Collected: 09/10/21 1304    Lab Status: Final result Specimen: Swab from Nasopharynx Updated: 09/10/21 1341    Narrative:      The following orders were created for panel order COVID PRE-OP / PRE-PROCEDURE SCREENING ORDER (NO ISOLATION) - Swab, Nasopharynx.  Procedure                               Abnormality         Status                     ---------                               -----------         ------                     COVID-19 and FLU A/B PCR...[[id number]]  Normal              Final result                 Please view results for these tests on the individual orders.    COVID-19 and FLU A/B PCR - Swab, Nasopharynx [[id number]]  (Normal) Collected: 09/10/21 1304    Lab Status: Final result Specimen: Swab from Nasopharynx Updated: 09/10/21 1341     COVID19 Not Detected     Influenza A PCR Not Detected     Influenza B PCR Not Detected    Narrative:      Fact sheet for providers: https://www.fda.gov/media/756535/download    Fact sheet for patients: https://www.fda.gov/media/297055/download    Test performed by PCR.          XR Chest 1 View    Result Date: 9/10/2021  EXAMINATION: XR  CHEST 1 VW-  INDICATION: HTN.  COMPARISON: None.  FINDINGS: Portable chest reveals cardiac and mediastinal silhouettes within normal limits. The lung fields are clear. No focal parenchymal opacification is present.  No pleural effusion or pneumothorax. The bony structures are unremarkable. Pulmonary vascularity is within normal limits.         Impression: No acute cardiopulmonary disease.  D:  09/10/2021 E:  09/10/2021            I have reviewed the medications:  Scheduled Meds:amLODIPine, 5 mg, Oral, Daily  amoxicillin-clavulanate, 1 tablet, Oral, Q8H  heparin (porcine), 5,000 Units, Subcutaneous, Q8H  lisinopril, 20 mg, Oral, Daily  senna-docusate sodium, 2 tablet, Oral, BID  sodium chloride, 10 mL, Intravenous, Q12H      Continuous Infusions:niCARdipine, 5-15 mg/hr, Last Rate: 10 mg/hr (09/11/21 0907)      PRN Meds:.•  acetaminophen **OR** acetaminophen **OR** acetaminophen  •  senna-docusate sodium **AND** polyethylene glycol **AND** bisacodyl **AND** bisacodyl  •  hydrALAZINE  •  ondansetron  •  sodium chloride  •  sodium chloride    Assessment/Plan   Assessment & Plan     Active Hospital Problems    Diagnosis  POA   • **Hypertensive urgency [I16.0]  Yes   • Anemia [D64.9]  Unknown   • Paronychia of finger of left hand [L03.012]  Unknown      Resolved Hospital Problems   No resolved problems to display.        Brief Hospital Course to date:  Cynthia Ledezma is a 35 y.o. female with past medical history significant for hypertension.  Evidently patient has been out of her medication and not following with her primary care physician recently and has not taken any antihypertensive medication for at least a month and a half.  Patient came to the ER for left fourth digit pain and swelling.  Patient was noticed to have blood pressure systolically greater than 200.  Was a started on Cardene drip was admitted to the hospital.  Patient still is on Cardene.    Plan:  -Start lisinopril and amlodipine.  Also give as needed  IV hydralazine and try to wean off of Cardene drip.  -Culture drainage from left fourth digit.  -Start Augmentin  -Labs in a.m. and home soon.    DVT prophylaxis:  Medical DVT prophylaxis orders are present.       AM-PAC 6 Clicks Score (PT): 24 (09/11/21 0825)    Disposition: I expect the patient to be discharged home soon.    CODE STATUS:   Code Status and Medical Interventions:   Ordered at: 09/10/21 8973     Level Of Support Discussed With:    Patient     Code Status:    CPR     Medical Interventions (Level of Support Prior to Arrest):    Full       Jonny Jones MD  09/11/21

## 2021-09-11 NOTE — PLAN OF CARE
Goal Outcome Evaluation:  Plan of Care Reviewed With: patient  Pt c/o pain in left hand ring finger. VSS

## 2021-09-12 PROCEDURE — 25010000002 HEPARIN (PORCINE) PER 1000 UNITS: Performed by: NURSE PRACTITIONER

## 2021-09-12 PROCEDURE — 25010000002 DIPHENHYDRAMINE PER 50 MG

## 2021-09-12 PROCEDURE — 25010000002 VANCOMYCIN 10 G RECONSTITUTED SOLUTION

## 2021-09-12 RX ORDER — AMLODIPINE BESYLATE 5 MG/1
5 TABLET ORAL
Status: DISCONTINUED | OUTPATIENT
Start: 2021-09-12 | End: 2021-09-13

## 2021-09-12 RX ORDER — AMLODIPINE BESYLATE 5 MG/1
5 TABLET ORAL 2 TIMES DAILY
Status: DISCONTINUED | OUTPATIENT
Start: 2021-09-12 | End: 2021-09-12

## 2021-09-12 RX ORDER — LISINOPRIL 20 MG/1
20 TABLET ORAL ONCE
Status: DISCONTINUED | OUTPATIENT
Start: 2021-09-12 | End: 2021-09-12

## 2021-09-12 RX ORDER — LISINOPRIL 20 MG/1
20 TABLET ORAL DAILY
Status: DISCONTINUED | OUTPATIENT
Start: 2021-09-13 | End: 2021-09-13 | Stop reason: HOSPADM

## 2021-09-12 RX ORDER — DIPHENHYDRAMINE HYDROCHLORIDE 50 MG/ML
25 INJECTION INTRAMUSCULAR; INTRAVENOUS ONCE
Status: COMPLETED | OUTPATIENT
Start: 2021-09-12 | End: 2021-09-12

## 2021-09-12 RX ORDER — DIPHENHYDRAMINE HYDROCHLORIDE 50 MG/ML
INJECTION INTRAMUSCULAR; INTRAVENOUS
Status: COMPLETED
Start: 2021-09-12 | End: 2021-09-12

## 2021-09-12 RX ORDER — LISINOPRIL 40 MG/1
40 TABLET ORAL DAILY
Status: DISCONTINUED | OUTPATIENT
Start: 2021-09-13 | End: 2021-09-12

## 2021-09-12 RX ADMIN — SODIUM CHLORIDE, PRESERVATIVE FREE 10 ML: 5 INJECTION INTRAVENOUS at 20:25

## 2021-09-12 RX ADMIN — HEPARIN SODIUM 5000 UNITS: 5000 INJECTION, SOLUTION INTRAVENOUS; SUBCUTANEOUS at 06:13

## 2021-09-12 RX ADMIN — VANCOMYCIN HYDROCHLORIDE 2000 MG: 100 INJECTION, POWDER, LYOPHILIZED, FOR SOLUTION INTRAVENOUS at 14:40

## 2021-09-12 RX ADMIN — DOCUSATE SODIUM 50 MG AND SENNOSIDES 8.6 MG 2 TABLET: 8.6; 5 TABLET, FILM COATED ORAL at 20:25

## 2021-09-12 RX ADMIN — HEPARIN SODIUM 5000 UNITS: 5000 INJECTION, SOLUTION INTRAVENOUS; SUBCUTANEOUS at 14:40

## 2021-09-12 RX ADMIN — DIPHENHYDRAMINE HYDROCHLORIDE 25 MG: 50 INJECTION INTRAMUSCULAR; INTRAVENOUS at 16:20

## 2021-09-12 RX ADMIN — AMLODIPINE BESYLATE 5 MG: 5 TABLET ORAL at 14:40

## 2021-09-12 RX ADMIN — SODIUM CHLORIDE, PRESERVATIVE FREE 10 ML: 5 INJECTION INTRAVENOUS at 08:30

## 2021-09-12 RX ADMIN — HEPARIN SODIUM 5000 UNITS: 5000 INJECTION, SOLUTION INTRAVENOUS; SUBCUTANEOUS at 20:25

## 2021-09-12 RX ADMIN — AMLODIPINE BESYLATE 5 MG: 5 TABLET ORAL at 08:29

## 2021-09-12 RX ADMIN — LISINOPRIL 20 MG: 20 TABLET ORAL at 08:29

## 2021-09-12 RX ADMIN — AMOXICILLIN AND CLAVULANATE POTASSIUM 500 MG: 500; 125 TABLET, FILM COATED ORAL at 06:13

## 2021-09-12 RX ADMIN — DOCUSATE SODIUM 50 MG AND SENNOSIDES 8.6 MG 2 TABLET: 8.6; 5 TABLET, FILM COATED ORAL at 08:29

## 2021-09-12 NOTE — PROGRESS NOTES
"Pharmacy Consult-Vancomycin Dosing  Cynthia Ledezma is a  35 y.o. female receiving vancomycin therapy.     Indication:Infection to left 4th finger   Consulting Provider:Dr. Jones  ID Consult:None    Goal AUC: 400 - 600 mg/L*hr    Current Antimicrobial Therapy  Anti-Infectives (From admission, onward)      Ordered     Dose/Rate Route Frequency Start Stop    09/12/21 1340  Pharmacy to dose vancomycin     Jonny Jones MD reviewed the order on 09/12/21 1341.   Ordering Provider: Jonny Jones MD     Does not apply Continuous PRN 09/12/21 1339 09/17/21 1338            Allergies  Allergies as of 09/10/2021    (No Known Allergies)       Labs    Results from last 7 days   Lab Units 09/11/21  0350 09/10/21  1443   BUN mg/dL 12 13   CREATININE mg/dL 0.83 0.80       Results from last 7 days   Lab Units 09/11/21  0350 09/10/21  1443   WBC 10*3/mm3 7.01 6.90       Evaluation of Dosing     Last Dose Received in the ED/Outside Facility: None  Is Patient on Dialysis or Renal Replacement: None    Ht - 165.1 cm (65\")  Wt - 97.2 kg (214 lb 4.8 oz)    Estimated Creatinine Clearance: 109.2 mL/min (by C-G formula based on SCr of 0.83 mg/dL).    Intake & Output (last 3 days)         09/09 0701 - 09/10 0700 09/10 0701 - 09/11 0700 09/11 0701 - 09/12 0700 09/12 0701 - 09/13 0700    P.O.    660    Total Intake(mL/kg)    660 (6.8)    Urine (mL/kg/hr)  800  600 (0.9)    Total Output  800  600    Net  -800  +60                    Microbiology and Radiology  Microbiology Results (last 10 days)       Procedure Component Value - Date/Time    Wound Culture - Drainage, Hand, Digit Left [[id number]]  (Abnormal) Collected: 09/11/21 1529    Lab Status: Preliminary result Specimen: Drainage from Hand, Digit Left Updated: 09/12/21 1326     Wound Culture Light growth (2+) Staphylococcus aureus, MRSA     Comment: Methicillin resistant Staphylococcus aureus, Patient may be an isolation risk.        Gram Stain No organisms seen      Rare (1+) WBCs " per low power field      Rare (1+) Epithelial cells per low power field    COVID PRE-OP / PRE-PROCEDURE SCREENING ORDER (NO ISOLATION) - Swab, Nasopharynx [[id number]]  (Normal) Collected: 09/10/21 1304    Lab Status: Final result Specimen: Swab from Nasopharynx Updated: 09/10/21 1341    Narrative:      The following orders were created for panel order COVID PRE-OP / PRE-PROCEDURE SCREENING ORDER (NO ISOLATION) - Swab, Nasopharynx.  Procedure                               Abnormality         Status                     ---------                               -----------         ------                     COVID-19 and FLU A/B PCR...[[id number]]  Normal              Final result                 Please view results for these tests on the individual orders.    COVID-19 and FLU A/B PCR - Swab, Nasopharynx [[id number]]  (Normal) Collected: 09/10/21 1304    Lab Status: Final result Specimen: Swab from Nasopharynx Updated: 09/10/21 1341     COVID19 Not Detected     Influenza A PCR Not Detected     Influenza B PCR Not Detected    Narrative:      Fact sheet for providers: https://www.fda.gov/media/470823/download    Fact sheet for patients: https://www.fda.gov/media/391789/download    Test performed by PCR.            Reported Vancomycin Levels               InsightRX AUC Calculation:    Current AUC:  New Start mg/L*hr    Predicted Steady State AUC on Current Dose:476 mg/L*hr  _________________________________    Predicted Steady State AUC on New Dose:  New Start mg/L*hr    Assessment/Plan:    Vancomycin therapy was initiated for Infection to left 4th finger . Goal AUC range 400 - 600 mg/L*hr.   Will start vancomycin loading dose of 2000 mg (~ 20.5 mg/kg)IV once followed by a vancomycin maintenance dose of 1250 mg q12hr (~13 mg/kg) to target therapeutic AUC of 476 mg/L*hr  Obtain vancomycin trough level on 9/14 with AM level   Monitor for changes in renal function, cultures, and clinical status.   Pharmacy will follow.     Thank  ángel,    Rhianna Davis  Pharmacy Resident  9/12/2021  14:12 EDT

## 2021-09-12 NOTE — PLAN OF CARE
Inpatient RN   Plan of Care Update  ________________________________________________    Patient Name:  Cynthia Ledezma  YOB: 1985  MRN: [id number]  Admit Date:  9/10/2021      Assessment Date:  9/12/2021    Vital Signs stable, Sinus Rythym, Pt currently on room air Pt has been sleeping well with a Pain status of no pain and finding no nausea and no vomiting.     Pt orientated to person, place, time and situation with LOC of alert.    UOP adequate.    Pt's BP elevated due to stress. Monitored BP every 15 minutes and encouraged relaxation techniques since BP didn't meet parameters for prn medication. After utilizing relaxing techniques, pt's BP went back down and she was able to get some rest.    Pt has no requests at this time.       Will continue to monitor.         Electronically signed by:  RAMU WORTHINGTON RN  09/12/21 05:22 EDT    Problem: Adult Inpatient Plan of Care  Goal: Plan of Care Review  Outcome: Ongoing, Progressing  Goal: Patient-Specific Goal (Individualized)  Outcome: Ongoing, Progressing  Goal: Absence of Hospital-Acquired Illness or Injury  Outcome: Ongoing, Progressing  Intervention: Identify and Manage Fall Risk  Recent Flowsheet Documentation  Taken 9/12/2021 0400 by Ramu Worthington, RN  Safety Promotion/Fall Prevention:   activity supervised   assistive device/personal items within reach   clutter free environment maintained   fall prevention program maintained   nonskid shoes/slippers when out of bed   room organization consistent   safety round/check completed  Taken 9/12/2021 0200 by Ramu Worthington, RN  Safety Promotion/Fall Prevention:   activity supervised   assistive device/personal items within reach   clutter free environment maintained   fall prevention program maintained   room organization consistent   safety round/check completed   nonskid shoes/slippers when out of bed  Taken 9/12/2021 0000 by Ramu Worthington RN  Safety Promotion/Fall Prevention:   activity supervised    assistive device/personal items within reach   clutter free environment maintained   fall prevention program maintained   nonskid shoes/slippers when out of bed   room organization consistent   safety round/check completed  Taken 9/11/2021 2200 by Diamante Mckenna RN  Safety Promotion/Fall Prevention:   activity supervised   assistive device/personal items within reach   clutter free environment maintained   fall prevention program maintained   nonskid shoes/slippers when out of bed   room organization consistent   safety round/check completed  Taken 9/11/2021 2000 by Diamante Mckenna RN  Safety Promotion/Fall Prevention:   activity supervised   clutter free environment maintained   assistive device/personal items within reach   nonskid shoes/slippers when out of bed   room organization consistent  Intervention: Prevent Skin Injury  Recent Flowsheet Documentation  Taken 9/12/2021 0400 by Diamante Mckenna RN  Body Position: position changed independently  Taken 9/12/2021 0200 by Diamante Mckenna RN  Body Position: position changed independently  Skin Protection:   adhesive use limited   incontinence pads utilized   transparent dressing maintained   tubing/devices free from skin contact  Taken 9/12/2021 0000 by Diamante Mckenna RN  Body Position: position changed independently  Skin Protection:   adhesive use limited   incontinence pads utilized   transparent dressing maintained   tubing/devices free from skin contact  Taken 9/11/2021 2200 by Diamante Mckenna RN  Body Position: position changed independently  Skin Protection:   adhesive use limited   incontinence pads utilized   transparent dressing maintained   tubing/devices free from skin contact  Taken 9/11/2021 2000 by Diamante Mckenna RN  Body Position: position changed independently  Skin Protection:   adhesive use limited   incontinence pads utilized   transparent dressing maintained   tubing/devices free from skin contact  Goal: Optimal Comfort and Wellbeing  Outcome: Ongoing,  Progressing  Intervention: Provide Person-Centered Care  Recent Flowsheet Documentation  Taken 9/11/2021 2000 by Diamante Mckenna, RN  Trust Relationship/Rapport:   care explained   emotional support provided   choices provided   empathic listening provided   questions answered   questions encouraged   reassurance provided   thoughts/feelings acknowledged  Goal: Readiness for Transition of Care  Outcome: Ongoing, Progressing     Problem: Hypertension Acute  Goal: Blood Pressure Within Desired Range  Outcome: Ongoing, Progressing  Intervention: Normalize Blood Pressure  Recent Flowsheet Documentation  Taken 9/12/2021 0400 by Diamante Mckenna, RN  Medication Review/Management: medications reviewed  Taken 9/12/2021 0200 by Diamante Mckenna, MY  Medication Review/Management: medications reviewed  Taken 9/12/2021 0000 by Diamante Mckenna, RN  Medication Review/Management: medications reviewed  Taken 9/11/2021 2200 by Diamante Mckenna, MY  Medication Review/Management: medications reviewed   Goal Outcome Evaluation:

## 2021-09-12 NOTE — PLAN OF CARE
Goal Outcome Evaluation:  Plan of Care Reviewed With: patient         Pt c/o itching 1 hour after starting iv vancomycin. Few red streaks noted on both arms and back.  MD notified, Vancomycin discontinued.  Benadryl 25 mg iv given.

## 2021-09-13 VITALS
HEIGHT: 65 IN | BODY MASS INDEX: 36.06 KG/M2 | TEMPERATURE: 98.4 F | WEIGHT: 216.4 LBS | RESPIRATION RATE: 16 BRPM | OXYGEN SATURATION: 99 % | HEART RATE: 60 BPM | SYSTOLIC BLOOD PRESSURE: 140 MMHG | DIASTOLIC BLOOD PRESSURE: 97 MMHG

## 2021-09-13 LAB
ANION GAP SERPL CALCULATED.3IONS-SCNC: 9 MMOL/L (ref 5–15)
BACTERIA SPEC AEROBE CULT: ABNORMAL
BUN SERPL-MCNC: 13 MG/DL (ref 6–20)
BUN/CREAT SERPL: 16.7 (ref 7–25)
CALCIUM SPEC-SCNC: 9.1 MG/DL (ref 8.6–10.5)
CHLORIDE SERPL-SCNC: 103 MMOL/L (ref 98–107)
CO2 SERPL-SCNC: 24 MMOL/L (ref 22–29)
CREAT SERPL-MCNC: 0.78 MG/DL (ref 0.57–1)
GFR SERPL CREATININE-BSD FRML MDRD: 102 ML/MIN/1.73
GLUCOSE SERPL-MCNC: 93 MG/DL (ref 65–99)
GRAM STN SPEC: ABNORMAL
POTASSIUM SERPL-SCNC: 4.4 MMOL/L (ref 3.5–5.2)
SODIUM SERPL-SCNC: 136 MMOL/L (ref 136–145)

## 2021-09-13 PROCEDURE — 25010000002 HEPARIN (PORCINE) PER 1000 UNITS: Performed by: NURSE PRACTITIONER

## 2021-09-13 PROCEDURE — 99238 HOSP IP/OBS DSCHRG MGMT 30/<: CPT | Performed by: INTERNAL MEDICINE

## 2021-09-13 PROCEDURE — 80048 BASIC METABOLIC PNL TOTAL CA: CPT

## 2021-09-13 RX ORDER — AMLODIPINE BESYLATE 10 MG/1
10 TABLET ORAL
Status: DISCONTINUED | OUTPATIENT
Start: 2021-09-14 | End: 2021-09-13 | Stop reason: HOSPADM

## 2021-09-13 RX ORDER — SULFAMETHOXAZOLE AND TRIMETHOPRIM 800; 160 MG/1; MG/1
1 TABLET ORAL EVERY 12 HOURS SCHEDULED
Status: DISCONTINUED | OUTPATIENT
Start: 2021-09-13 | End: 2021-09-13 | Stop reason: HOSPADM

## 2021-09-13 RX ORDER — SULFAMETHOXAZOLE AND TRIMETHOPRIM 800; 160 MG/1; MG/1
1 TABLET ORAL EVERY 12 HOURS SCHEDULED
Qty: 14 TABLET | Refills: 0 | Status: SHIPPED | OUTPATIENT
Start: 2021-09-13 | End: 2021-09-20

## 2021-09-13 RX ORDER — AMLODIPINE BESYLATE 10 MG/1
10 TABLET ORAL
Qty: 30 TABLET | Refills: 0 | Status: SHIPPED | OUTPATIENT
Start: 2021-09-14

## 2021-09-13 RX ADMIN — AMLODIPINE BESYLATE 5 MG: 5 TABLET ORAL at 08:36

## 2021-09-13 RX ADMIN — LISINOPRIL 20 MG: 20 TABLET ORAL at 08:36

## 2021-09-13 RX ADMIN — SODIUM CHLORIDE, PRESERVATIVE FREE 10 ML: 5 INJECTION INTRAVENOUS at 08:42

## 2021-09-13 RX ADMIN — HEPARIN SODIUM 5000 UNITS: 5000 INJECTION, SOLUTION INTRAVENOUS; SUBCUTANEOUS at 06:02

## 2021-09-13 NOTE — CASE MANAGEMENT/SOCIAL WORK
Continued Stay Note  [facility]     Patient Name: Cynthia Ledezma  MRN: [id number]  Today's Date: 9/13/2021    Admit Date: 9/10/2021    Discharge Plan     Row Name 09/13/21 1701       Plan    Plan  Home    Plan Comments  Patient discharged p/t CM seeing patient today. Dc'd home. Chart reviewed. No DC needs noted.    Final Discharge Disposition Code  01 - home or self-care        Discharge Codes    No documentation.       Expected Discharge Date and Time     Expected Discharge Date Expected Discharge Time    Sep 13, 2021             Michelle Frost RN

## 2021-09-13 NOTE — CONSULTS
INFECTIOUS DISEASE CONSULT/INITIAL HOSPITAL VISIT    Cynthia Ledezma  1985  [id number]    Date of Consult: 2021    Admission Date: 9/10/2021      Requesting Provider: No ref. provider found  Evaluating Physician: Tab Henson MD    Reason for Consultation:  MRSA finger infection    History of present illness:    Patient is a 35 y.o. female presents with hypertension also found to have painful area of left fourth finger had incision and drainage emergency room by medical personnel cultures are growing MRSA we are asked to address significance.  Patient is postictal started on IV antibiotics had red man syndrome with vancomycin tolerated this after receiving Benadryl.    Patient feels better wants to go home        Past Medical History:   Diagnosis Date   • Anemia    • Hypertension        Past Surgical History:   Procedure Laterality Date   •  SECTION     • HYSTERECTOMY         Family History   Problem Relation Age of Onset   • Heart disease Mother    • Obesity Mother    • No Known Problems Father        Social History     Socioeconomic History   • Marital status:      Spouse name: Not on file   • Number of children: Not on file   • Years of education: Not on file   • Highest education level: Not on file   Tobacco Use   • Smoking status: Never Smoker   • Smokeless tobacco: Never Used   Substance and Sexual Activity   • Alcohol use: No   • Drug use: No       No Known Allergies      Medication:    Current Facility-Administered Medications:   •  acetaminophen (TYLENOL) tablet 650 mg, 650 mg, Oral, Q4H PRN, 650 mg at 21 1622 **OR** acetaminophen (TYLENOL) 160 MG/5ML solution 650 mg, 650 mg, Oral, Q4H PRN **OR** acetaminophen (TYLENOL) suppository 650 mg, 650 mg, Rectal, Q4H PRN, Nay Angelo APRN  •  aluminum-magnesium hydroxide-simethicone (MAALOX MAX) 400-400-40 MG/5ML suspension 15 mL, 15 mL, Oral, Q6H PRN, Soo Oh APRN, 15 mL at 21 2124  •  amLODIPine  (NORVASC) tablet 5 mg, 5 mg, Oral, Q24H, Jonny Jones MD, 5 mg at 09/13/21 0836  •  sennosides-docusate (PERICOLACE) 8.6-50 MG per tablet 2 tablet, 2 tablet, Oral, BID, 2 tablet at 09/12/21 2025 **AND** polyethylene glycol (MIRALAX) packet 17 g, 17 g, Oral, Daily PRN **AND** bisacodyl (DULCOLAX) EC tablet 5 mg, 5 mg, Oral, Daily PRN **AND** bisacodyl (DULCOLAX) suppository 10 mg, 10 mg, Rectal, Daily PRN, Nay Angelo APRN  •  heparin (porcine) 5000 UNIT/ML injection 5,000 Units, 5,000 Units, Subcutaneous, Q8H, Nay Angelo APRN, 5,000 Units at 09/13/21 0602  •  hydrALAZINE (APRESOLINE) injection 20 mg, 20 mg, Intravenous, Q6H PRN, Jonny Jones MD  •  lisinopril (PRINIVIL,ZESTRIL) tablet 20 mg, 20 mg, Oral, Daily, Jonny Jones MD, 20 mg at 09/13/21 0836  •  ondansetron (ZOFRAN) injection 4 mg, 4 mg, Intravenous, Q6H PRN, Nay Angelo APRN  •  Pharmacy to dose vancomycin, , Does not apply, Continuous PRN, Jonny Jones MD  •  sodium chloride 0.9 % flush 10 mL, 10 mL, Intravenous, PRN, Zhao Serna DO  •  sodium chloride 0.9 % flush 10 mL, 10 mL, Intravenous, Q12H, Nay Angelo APRN, 10 mL at 09/13/21 0842  •  sodium chloride 0.9 % flush 10 mL, 10 mL, Intravenous, PRN, Nay Angelo APRN  •  vancomycin 1250 mg/250 mL 0.9% NS IVPB ([facility]), 1,250 mg, Intravenous, Q12H, Aubrey Sy, PharmD    Antibiotics:  Anti-Infectives (From admission, onward)    Ordered     Dose/Rate Route Frequency Start Stop    09/12/21 1418  vancomycin 1250 mg/250 mL 0.9% NS IVPB (S)     Jonny Jones MD reviewed the order on 09/13/21 1155.   Ordering Provider: Aubrey Sy, PharmD    1,250 mg  over 90 Minutes Intravenous Every 12 Hours 09/13/21 0400 09/20/21 0359    09/12/21 1418  vancomycin 2000 mg/500 mL 0.9% NS IVPB ([facility])     Ordering Provider: Aubrey Sy PharmD    2,000 mg Intravenous Once 09/12/21 1515 09/12/21 1440    09/12/21 1340  Pharmacy to dose vancomycin     Karen  MD Jonny reviewed the order on 21 1341.   Ordering Provider: Jonny Jones MD     Does not apply Continuous PRN 21 1339 21 1338            Review of Systems:  Denies fevers or chills has improving pain of left hand has hypertension rest of review of systems were discussed and were unremarkable          Physical Exam:   Vital Signs  Temp (24hrs), Av.3 °F (36.8 °C), Min:97.9 °F (36.6 °C), Max:98.5 °F (36.9 °C)    Temp  Min: 97.9 °F (36.6 °C)  Max: 98.5 °F (36.9 °C)  BP  Min: 130/109  Max: 158/109  Pulse  Min: 56  Max: 83  Resp  Min: 16  Max: 18  SpO2  Min: 96 %  Max: 100 %    GENERAL: Awake and alert, in no acute distress.   HEENT: Normocephalic, atraumatic.  PERRL. EOMI. No conjunctival injection. No icterus. Oropharynx clear without evidence of thrush or exudate. No evidence of peridontal disease.      HEART: RRR; No murmur  LUNGS: Clear to auscultation bilaterally   ABDOMEN: Soft, nontender  EXT:  No edema  :  Without Montaño catheter.  MSK: No joint deformity left hand fourth finger without swelling redness or tenderness  SKIN: Warm and dry without cutaneous eruptions on Inspection/palpation.    NEURO: Oriented to PPT.  PSYCHIATRIC: Normal insight and judgement. Cooperative with PE    Laboratory Data    Results from last 7 days   Lab Units 21  0350 09/10/21  1443   WBC 10*3/mm3 7.01 6.90   HEMOGLOBIN g/dL 13.0 13.4   HEMATOCRIT % 40.1 41.1   PLATELETS 10*3/mm3 356 352     Results from last 7 days   Lab Units 21  0638   SODIUM mmol/L 136   POTASSIUM mmol/L 4.4   CHLORIDE mmol/L 103   CO2 mmol/L 24.0   BUN mg/dL 13   CREATININE mg/dL 0.78   GLUCOSE mg/dL 93   CALCIUM mg/dL 9.1     Results from last 7 days   Lab Units 21  0350   ALK PHOS U/L 64   BILIRUBIN mg/dL 0.4   ALT (SGPT) U/L 16   AST (SGOT) U/L 17                         Estimated Creatinine Clearance: 116.8 mL/min (by C-G formula based on SCr of 0.78 mg/dL).      Microbiology:  No results found for: ACANTHMIKG,  "AFBCX, BPERTUSSISCX, BLOODCX  No results found for: BCIDPCR, CXREFLEX, CSFCX, CULTURETIS  No results found for: CULTURES, HSVCX, URCX  No results found for: EYECULTURE, GCCX, HSVCULTURE, LABHSV  No results found for: LEGIONELLA, MRSACX, MUMPSCX, MYCOPLASCX  No results found for: NOCARDIACX, STOOLCX  No results found for: THROATCX, UNSTIMCULT, URINECX, CULTURE, VZVCULTUR  Wound Culture   Date Value Ref Range Status   09/11/2021 Light growth (2+) Staphylococcus aureus, MRSA (A)  Final     Comment:     Methicillin resistant Staphylococcus aureus, Patient may be an isolation risk.           Radiology:  Imaging Results (Last 72 Hours)     Procedure Component Value Units Date/Time    XR Chest 1 View [[id number]] Collected: 09/10/21 1309     Updated: 09/12/21 1744    Narrative:      EXAMINATION: XR CHEST 1 VW-      INDICATION: HTN.      COMPARISON: None.     FINDINGS: Portable chest reveals cardiac and mediastinal silhouettes  within normal limits. The lung fields are clear. No focal parenchymal  opacification is present.  No pleural effusion or pneumothorax. The bony  structures are unremarkable. Pulmonary vascularity is within normal  limits.           Impression:      No acute cardiopulmonary disease.     D:  09/10/2021  E:  09/10/2021     This report was finalized on 9/12/2021 5:41 PM by Dr. Ariana Correa MD.           9/10/2021 chest was independently reviewed    Impression:   MRSA paronychial infection of left fourth finger  \"red man\" syndrome secondary vancomycin  Hypertension    PLAN/RECOMMENDATIONS:   Thank you for asking us to see Cynthia Ledezma, I recommend the following:  Hand infection appears appropriately treated I encourage patient do warm water soaks for several days     patient can be discharged on oral Bactrim double strength twice a day for additional 7 days upon discharge    Patient can follow-up with primary care        Tab Henson MD  9/13/2021  13:41 EDT                  "

## 2021-09-18 NOTE — DISCHARGE SUMMARY
[facility] Medicine Services  DISCHARGE SUMMARY    Patient Name: Cynthia Ledezma  : 1985  MRN: [id number]    Date of Admission: 9/10/2021 12:27 PM  Date of Discharge:  21  Primary Care Physician: Amy Painter MD    Consults     Date and Time Order Name Status Description    2021 12:34 AM Inpatient Infectious Diseases Consult Completed           Hospital Course     Presenting Problem:   Hypertensive urgency [I16.0]    Active Hospital Problems    Diagnosis  POA   • **Hypertensive urgency [I16.0]  Yes   • Anemia [D64.9]  Unknown   • Paronychia of finger of left hand [L03.012]  Unknown      Resolved Hospital Problems   No resolved problems to display.          Hospital Course:  Cynthia Ledezma is a 35 y.o. female with past medical history significant for hypertension.  Evidently patient has been out of her medication and not following with her primary care physician recently and has not taken any antihypertensive medication for at least a month and a half.  Patient came to the ER for left fourth digit pain and swelling.  Patient was noticed to have blood pressure systolically greater than 200.  Initial evaluation included a CBC which was a normal study.  Hemoglobin A1c was at 5.60.  Comprehensive metabolic panel also was a totally normal study.   Patient was started on Cardene drip and was admitted to the hospital.  On the floor patient was started on lisinopril and Norvasc.  She was weaned off of Cardene drip and blood pressure was controlled by oral agents.  We also did a culture of the left fourth digit which grew MRSA.  Prior to that patient was put on Augmentin.  After the culture result I switched her to vancomycin and consulted infectious disease.  Infectious disease evaluated the patient and recommended that this could be treated with overall Bactrim double strength.  Patient was very eager to go home.  I discharged the patient home with double strength  Bactrim and lisinopril and Norvasc for control of blood pressure.      Discharge Follow Up Recommendations for outpatient labs/diagnostics:      Day of Discharge     HPI:   Resting in bed in no acute distress and overall comfortable.  No fever or chills.  No chest pain or palpitation or shortness of breath.  No nausea, vomiting, diarrhea, abdominal pain.    Review of Systems  As above    Vital Signs:         Physical Exam:  Constitutional: No acute distress, looks comfortable  HENT: NCAT, mucous membranes moist  Respiratory: Clear to auscultation bilaterally, respiratory effort normal   Cardiovascular: RRR, no murmurs, rubs, or gallops  Gastrointestinal: Abdomen is obese.  Positive bowel sounds, soft, nontender, nondistended  Musculoskeletal: No bilateral ankle edema, swelling and tenderness of left fourth digit.  Psychiatric: Appropriate affect, cooperative  Neurologic: Oriented x 3, strength symmetric in all extremities, Cranial Nerves grossly intact to confrontation, speech clear  Skin: No rashes    Pertinent  and/or Most Recent Results     LAB RESULTS:      Lab 09/11/21  0350   WBC 7.01   HEMOGLOBIN 13.0   HEMATOCRIT 40.1   PLATELETS 356   NEUTROS ABS 4.19   IMMATURE GRANS (ABS) 0.02   LYMPHS ABS 1.64   MONOS ABS 1.00*   EOS ABS 0.11   MCV 81.7         Lab 09/13/21  0638 09/11/21  0350   SODIUM 136 137   POTASSIUM 4.4 3.7   CHLORIDE 103 103   CO2 24.0 24.0   ANION GAP 9.0 10.0   BUN 13 12   CREATININE 0.78 0.83   GLUCOSE 93 102*   CALCIUM 9.1 9.3         Lab 09/11/21  0350   TOTAL PROTEIN 7.2   ALBUMIN 3.90   GLOBULIN 3.3   ALT (SGPT) 16   AST (SGOT) 17   BILIRUBIN 0.4   ALK PHOS 64             Lab 09/11/21  0350   CHOLESTEROL 134   LDL CHOL 73   HDL CHOL 48   TRIGLYCERIDES 60             Brief Urine Lab Results  (Last result in the past 365 days)      Color   Clarity   Blood   Leuk Est   Nitrite   Protein   CREAT   Urine HCG        09/10/21 1235               Negative     09/10/21 1235 Yellow Clear Negative  Negative Negative Negative             Microbiology Results (last 10 days)     Procedure Component Value - Date/Time    Wound Culture - Drainage, Hand, Digit Left [[id number]]  (Abnormal)  (Susceptibility) Collected: 09/11/21 1529    Lab Status: Final result Specimen: Drainage from Hand, Digit Left Updated: 09/13/21 0919     Wound Culture Light growth (2+) Staphylococcus aureus, MRSA     Comment: Methicillin resistant Staphylococcus aureus, Patient may be an isolation risk.        Gram Stain No organisms seen      Rare (1+) WBCs per low power field      Rare (1+) Epithelial cells per low power field    Susceptibility      Staphylococcus aureus, MRSA      IZZY      Clindamycin Susceptible      Erythromycin Resistant      Inducible Clindamycin Resistance Negative      Oxacillin Resistant      Penicillin G Resistant      Rifampin Susceptible      Tetracycline Susceptible      Trimethoprim + Sulfamethoxazole Susceptible      Vancomycin Susceptible               Linear View               Susceptibility Comments     Staphylococcus aureus, MRSA    This isolate does not demonstrate inducible clindamycin resistance in vitro.               COVID PRE-OP / PRE-PROCEDURE SCREENING ORDER (NO ISOLATION) - Swab, Nasopharynx [[id number]]  (Normal) Collected: 09/10/21 1304    Lab Status: Final result Specimen: Swab from Nasopharynx Updated: 09/10/21 1341    Narrative:      The following orders were created for panel order COVID PRE-OP / PRE-PROCEDURE SCREENING ORDER (NO ISOLATION) - Swab, Nasopharynx.  Procedure                               Abnormality         Status                     ---------                               -----------         ------                     COVID-19 and FLU A/B PCR...[[id number]]  Normal              Final result                 Please view results for these tests on the individual orders.    COVID-19 and FLU A/B PCR - Swab, Nasopharynx [[id number]]  (Normal) Collected: 09/10/21 1304    Lab Status: Final  result Specimen: Swab from Nasopharynx Updated: 09/10/21 1341     COVID19 Not Detected     Influenza A PCR Not Detected     Influenza B PCR Not Detected    Narrative:      Fact sheet for providers: https://www.fda.gov/media/415202/download    Fact sheet for patients: https://www.fda.gov/media/043133/download    Test performed by PCR.          XR Chest 1 View    Result Date: 9/12/2021  EXAMINATION: XR CHEST 1 VW-  INDICATION: HTN.  COMPARISON: None.  FINDINGS: Portable chest reveals cardiac and mediastinal silhouettes within normal limits. The lung fields are clear. No focal parenchymal opacification is present.  No pleural effusion or pneumothorax. The bony structures are unremarkable. Pulmonary vascularity is within normal limits.         No acute cardiopulmonary disease.  D:  09/10/2021 E:  09/10/2021  This report was finalized on 9/12/2021 5:41 PM by Dr. Ariana Correa MD.                        Discharge Details        Discharge Medications      New Medications      Instructions Start Date   sulfamethoxazole-trimethoprim 800-160 MG per tablet  Commonly known as: BACTRIM DS,SEPTRA DS   Take 1 tablet by mouth Every 12 (Twelve) Hours for 14 doses.         Changes to Medications      Instructions Start Date   amLODIPine 5 MG tablet  Commonly known as: NORVASC  What changed: Another medication with the same name was added. Make sure you understand how and when to take each.   5 mg, Oral, Daily      amLODIPine 10 MG tablet  Commonly known as: NORVASC  What changed: You were already taking a medication with the same name, and this prescription was added. Make sure you understand how and when to take each.   10 mg, Oral, Every 24 Hours Scheduled         Continue These Medications      Instructions Start Date   lisinopril 20 MG tablet  Commonly known as: PRINIVIL,ZESTRIL   20 mg, Oral, Daily             Allergies   Allergen Reactions   • Vancomycin Rash         Discharge Disposition:  Home or Self  Care    Diet:  Regular      Activity:  Activity Instructions     Activity as Tolerated                   CODE STATUS:    Code Status and Medical Interventions:   Ordered at: 09/10/21 2659     Level Of Support Discussed With:    Patient     Code Status:    CPR     Medical Interventions (Level of Support Prior to Arrest):    Full       No future appointments.    Additional Instructions for the Follow-ups that You Need to Schedule     Discharge Follow-up with PCP   As directed       Currently Documented PCP:    Amy Painter MD    PCP Phone Number:    [phone]     Follow Up Details: within one week                     Jonny Jones MD  09/17/21      Time Spent on Discharge:  I spent  28  minutes on this discharge activity which included: face-to-face encounter with the patient, reviewing the data in the system, coordination of the care with the nursing staff as well as consultants, documentation, and entering orders.